# Patient Record
Sex: MALE | Race: WHITE
[De-identification: names, ages, dates, MRNs, and addresses within clinical notes are randomized per-mention and may not be internally consistent; named-entity substitution may affect disease eponyms.]

---

## 2017-03-05 ENCOUNTER — HOSPITAL ENCOUNTER (OUTPATIENT)
Dept: HOSPITAL 75 - ER | Age: 32
LOS: 1 days | Discharge: HOME | End: 2017-03-06
Attending: SURGERY
Payer: COMMERCIAL

## 2017-03-05 VITALS — WEIGHT: 274.13 LBS | HEIGHT: 69 IN | BODY MASS INDEX: 40.6 KG/M2

## 2017-03-05 DIAGNOSIS — F17.210: ICD-10-CM

## 2017-03-05 DIAGNOSIS — K80.12: Primary | ICD-10-CM

## 2017-03-05 LAB
BASOPHILS # BLD AUTO: 0 10^3/UL (ref 0–0.1)
BASOPHILS NFR BLD AUTO: 0 % (ref 0–10)
EOSINOPHIL # BLD AUTO: 0 10^3/UL (ref 0–0.3)
EOSINOPHIL NFR BLD AUTO: 0 % (ref 0–10)
ERYTHROCYTE [DISTWIDTH] IN BLOOD BY AUTOMATED COUNT: 12.2 % (ref 10–14.5)
LYMPHOCYTES # BLD AUTO: 2.1 X 10^3 (ref 1–4)
LYMPHOCYTES NFR BLD AUTO: 9 % (ref 12–44)
MCH RBC QN AUTO: 29 PG (ref 25–34)
MCHC RBC AUTO-ENTMCNC: 35 G/DL (ref 32–36)
MCV RBC AUTO: 82 FL (ref 80–99)
MONOCYTES # BLD AUTO: 0.8 X 10^3 (ref 0–1)
MONOCYTES NFR BLD AUTO: 4 % (ref 0–12)
NEUTROPHILS # BLD AUTO: 20.1 X 10^3 (ref 1.8–7.8)
NEUTROPHILS NFR BLD AUTO: 87 % (ref 42–75)
PLATELET # BLD: 336 10^3/UL (ref 130–400)
PMV BLD AUTO: 10.7 FL (ref 7.4–10.4)
RBC # BLD AUTO: 5.29 10^6/UL (ref 4.35–5.85)
WBC # BLD AUTO: 23 10^3/UL (ref 4.3–11)

## 2017-03-05 PROCEDURE — 83690 ASSAY OF LIPASE: CPT

## 2017-03-05 PROCEDURE — 80053 COMPREHEN METABOLIC PANEL: CPT

## 2017-03-05 PROCEDURE — 85027 COMPLETE CBC AUTOMATED: CPT

## 2017-03-05 PROCEDURE — 96375 TX/PRO/DX INJ NEW DRUG ADDON: CPT

## 2017-03-05 PROCEDURE — 88304 TISSUE EXAM BY PATHOLOGIST: CPT

## 2017-03-05 PROCEDURE — 85025 COMPLETE CBC W/AUTO DIFF WBC: CPT

## 2017-03-05 PROCEDURE — 85007 BL SMEAR W/DIFF WBC COUNT: CPT

## 2017-03-05 PROCEDURE — 74177 CT ABD & PELVIS W/CONTRAST: CPT

## 2017-03-05 PROCEDURE — 94760 N-INVAS EAR/PLS OXIMETRY 1: CPT

## 2017-03-05 PROCEDURE — 87081 CULTURE SCREEN ONLY: CPT

## 2017-03-05 PROCEDURE — 82150 ASSAY OF AMYLASE: CPT

## 2017-03-05 PROCEDURE — 81000 URINALYSIS NONAUTO W/SCOPE: CPT

## 2017-03-05 PROCEDURE — 96374 THER/PROPH/DIAG INJ IV PUSH: CPT

## 2017-03-05 PROCEDURE — 94640 AIRWAY INHALATION TREATMENT: CPT

## 2017-03-05 PROCEDURE — 36415 COLL VENOUS BLD VENIPUNCTURE: CPT

## 2017-03-05 PROCEDURE — 94664 DEMO&/EVAL PT USE INHALER: CPT

## 2017-03-05 NOTE — XMS REPORT
Continuity of Care Document

 Created on: 2017



MITCHEL FUENTES

External Reference #: [id number]

: 1985

Sex: Male



Demographics







 Address  [address] [place]  [address]

 

 Home Phone  [phone]

 

 Preferred Language  Unknown

 

 Marital Status  Unknown

 

 Buddhist Affiliation  Unknown

 

 Race  Unknown

 

 Ethnic Group  Unknown





Author







 Author  [facility]

 

 Organization  [facility]

 

 Address  Unknown

 

 Phone  Unavailable



                                                      



Allergies

                      





 Active                    Description                    Code                 
   Type                    Severity                    Reaction                
    Onset                    Reported/Identified                    
Relationship to Patient                    Clinical Status                

 

 Yes                    No Known Drug Allergies                    [id number]  
                  Drug Allergy                    Unknown                    N/
A                                         2013                           
                               



                                                                               
         



Medications

                                                                               
         



Problems

                      





 Date Dx Coded                    Attending                    Type            
        Code                    Diagnosis                    Diagnosed By      
          

 

 2013                    RK BENITEZ MD                    Ot    
                719.41                    JOINT PAIN-SHLDER                    
                 

 

 2013                    RK BENITEZ MD                    Ot    
                959.2                    SHLDR/UPPER ARM INJ NOS               
                      

 

 2013                    RK BENITEZ MD                    Ot    
                E000.8                    OTHER EXTERNAL CAUSE STATUS          
                           

 

 2013                    RK BENITEZ MD                    Ot    
                E849.4                    ACCID IN RECREATION AREA             
                        

 

 2013                    RK BENITEZ MD                    Ot    
                E928.9                    ACCIDENT NOS                         
            

 

 2013                    SINA DICK DO                    Ot     
               522.5                    PERIAPICAL ABSCESS                     
                

 

 2013                    SINA DICK DO                    Ot     
               525.9                    DENTAL DISORDER NOS                    
                 

 

 2013                    SARAH LEDEZMA, ALEXANDREA SCHULTZ                    Ot  
                  490                    BRONCHITIS NOS                        
             

 

 2013                    SARAH LEDEZMA, ALEXANDREA SCHULTZ                    Ot  
                  786.2                    COUGH                               
      



                                                                               
                                                                               
          



Procedures

                                                                               
         



Results

                                                                    



Encounters

                      





 ACCT No.                    Visit Date/Time                    Discharge      
              Status                    Pt. Type                    Provider   
                 Facility                    Loc./Unit                    
Complaint                

 

 [id number]                    2013 09:55:00                    2013 11:40:00                    DIS                    Emergency              
      ALEXANDREA SMITH MD                    [facility]                    ER                    CONGESTION COUGHING         
       

 

 [id number]                    2013 16:32:00                    2013 18:13:00                    DIS                    Emergency              
      SINA DICK DO                    [facility]
                    ER                    R CHEEK PAIN                

 

 [id number]                    2013 19:57:00                    2013 22:43:00                    DIS                    Emergency              
      RK BENITEZ MD                    [facility]                    ER                    R SHOULDER POPING OUT OF 
PLACE                

 

 [id number]                    2017 21:23:00                           
              ACT                    Emergency                    ELI LEDEZMA, 
RK HERNÁNDEZ                    [facility]                    AB PAIN

## 2017-03-05 NOTE — ED ABDOMINAL PAIN
General


Chief Complaint:  Abdominal/GI Problems


Stated Complaint:  AB PAIN


Nursing Triage Note:  


onset of right sided abdominal pain beginning at 1700 tonight.


Sepsis Screen:  No Definite Risk


Source of Information:  Patient


Exam Limitations:  No Limitations





History of Present Illness


Time Seen By Provider:  23:07


Initial Comments


31-year-old male patient presents to the emergency department with complaints 

of right-sided abdominal pain beginning at 1700 tonight.  Patient reports 

previous history of symptoms over the last month.  Worse with eating especially 

fatty foods.  Does report nausea and vomiting when pain begins.  Patient 

reports eating a meatball sub approx. 30 min prior to symptoms.


Timing/Duration:  Constant (constant pain since 1700 tonight.), Other (1700 

tonight.)


Severity/Quality:  Aching, Cramping, Sharp


Location:  Other (right-sided abdominal pain)


Radiation:  No Radiation


Activities at Onset:  Other (30 minutes after eating a meatball sub)


Modifying Factors:  Worsens With Eating, Worsens With Movement, Worsens With 

Palpation





Allergies and Home Medications


Allergies


Coded Allergies:  


     No Known Drug Allergies (Unverified , 5/30/13)





Home Medications


Albuterol 8.5 Gm Hfa.aer.ad #1 2 PUFF IH Q4H PRN PRN WHEEZING 


   2 PUFFS 


   Prescribed by: ALEXANDREA MELVIN on 11/17/13 1137


Azithromycin 250 Mg Tablet 5Days 1 TAB PO DAILY 


   Take 500 mg day #1, then 250 mg daily on days #2-5 


   Prescribed by: ALEXANDREA MELVIN on 11/17/13 1137





Review of Systems


Constitutional:  No chills, No fever, No malaise


Respiratory:   No Symptoms Reported


Cardiovascular:   No Symptoms Reported


Gastrointestinal:   See HPIDenies Abdomen Distended,  Abdominal PainDenies 

Blood Streaked Stools, Denies Constipated, Denies Diarrhea,  NauseaDenies 

Rectal Bleeding,  Vomiting


Genitourinary:  Denies Burning, Denies Discharge, Denies Frequency, Denies 

Flank Pain, Denies Hematuria, Denies Pain


Musculoskeletal:  No back pain


Skin:   no symptoms reported


Psychiatric/Neurological:   No Symptoms Reported





All Other Systems Reviewed


Negative Unless Noted:  Yes (Negative excepted noted.)





Past Medical-Social-Family Hx


Patient Social History


Recent Foreign Travel:  No


Contact w/Someone Who Travel:  No


Recent Infectious Disease Expo:  No





Surgeries


HX Surgeries:  No





Respiratory


Hx Respiratory Disorders:  Yes


Respiratory Disorders:  Pneumonia





Cardiovascular


Hx Cardiac Disorders:  No





Neurological


Hx Neurological Disorders:  No





Genitourinary


Hx Genitourinary Disorders:  No





Gastrointestinal


Hx Gastrointestinal Disorders:  No





Musculoskeletal


Hx Musculoskeletal Disorders:  No





Endocrine


Hx Endocrine Disorders:  No





HEENT


HX ENT Disorders:  No





Cancer


Hx Cancer:  No





Psychosocial


Hx Psychiatric Problems:  No





Integumentary


HX Skin/Integumentary Disorder:  No





Blood Transfusions


Hx Blood Disorders:  No





Reviewed Nursing Assessment


Reviewed/Agree w Nursing PMH:  Yes





Family Medical History


Significant Family History:  COPD





Physical Exam


Vital Signs





 VS - Last 72 Hours, by Label








 3/5/17





 22:22


 


Temp 97.2


 


Pulse 69


 


Resp 24


 


B/P 124/70





Capillary Refill : Less Than 3 Seconds


General Appearance:   WD/WN no apparent distress


HEENT:   PERRL/EOMI pharynx normal


Respiratory:   lungs clear normal breath sounds no respiratory distress


Cardiovascular:   regular rate, rhythm no murmur


Gastrointestinal:   normal bowel sounds soft no organomegalyNo distended,  

guarding (right upper quadrant)No rebound,  tenderness (right mid abdomen and 

right upper quadrant.  Positive Eli sign.)


Extremities:   normal capillary refill


Back:   normal inspection no CVA tenderness


Neurologic/Psychiatric:   alert oriented x 3 other (anxious)


Skin:   normal color warm/dry





Progress/Results/Core Measures


Results/Orders


Lab Results





 Laboratory Tests








Test


  3/5/17


23:30 Range/Units


 


 


Alanine Aminotransferase


(ALT/SGPT) 30 


  0-55  U/L


 


 


Albumin 4.2  3.2-4.5  G/DL


 


Alkaline Phosphatase 82    U/L


 


Anion Gap 11  5-14  MMOL/L


 


Aspartate Amino Transf


(AST/SGOT) 24 


  5-34  U/L


 


 


BUN/Creatinine Ratio 9   


 


Band Neutrophils 4   %


 


Basophils # (Auto)


  0.0 


  0.0-0.1


10^3/uL


 


Basophils % (Manual) 0   %


 


Basophils (%) (Auto) 0  0-10  %


 


Blood Morphology Comment NORMAL   


 


Blood Urea Nitrogen 9  7-18  MG/DL


 


Calcium Level 9.0  8.5-10.1  MG/DL


 


Carbon Dioxide Level 24  21-32  MMOL/L


 


Chloride Level 103    MMOL/L


 


Creatinine


  1.01 


  0.60-1.30


MG/DL


 


Eosinophils # (Auto)


  0.0 


  0.0-0.3


10^3/uL


 


Eosinophils % (Manual) 0   %


 


Eosinophils (%) (Auto) 0  0-10  %


 


Estimat Glomerular Filtration


Rate > 60 


   


 


 


Glucose Level 122 H   MG/DL


 


Hematocrit 44  40-54  %


 


Hemoglobin 15.1  13.3-17.7  G/DL


 


Lipase 24  8-78  U/L


 


Lymphocytes # (Auto) 2.1  1.0-4.0  X 10^3


 


Lymphocytes % (Manual) 11   %


 


Lymphocytes (%) (Auto) 9 L 12-44  %


 


Mean Corpuscular Hemoglobin 29  25-34  PG


 


Mean Corpuscular Hemoglobin


Concent 35 


  32-36  G/DL


 


 


Mean Corpuscular Volume 82  80-99  FL


 


Mean Platelet Volume 10.7 H 7.4-10.4  FL


 


Monocytes # (Auto) 0.8  0.0-1.0  X 10^3


 


Monocytes % (Manual) 2   %


 


Monocytes (%) (Auto) 4  0-12  %


 


Neutrophils # (Auto) 20.1 H 1.8-7.8  X 10^3


 


Neutrophils % (Manual) 77   %


 


Neutrophils (%) (Auto) 87 H 42-75  %


 


Platelet Count


  336 


  130-400


10^3/uL


 


Potassium Level 3.6  3.6-5.0  MMOL/L


 


Reactive Lymphocytes 6   %


 


Red Blood Count


  5.29 


  4.35-5.85


10^6/uL


 


Red Cell Distribution Width 12.2  10.0-14.5  %


 


Sodium Level 138  135-145  MMOL/L


 


Total Bilirubin 0.4  0.1-1.0  MG/DL


 


Total Protein 7.3  6.4-8.2  G/DL


 


White Blood Count


  23.0 H


  4.3-11.0


10^3/uL








My Orders





 Orders-ROVERTO CLARKE


Saline Lock/Iv-Start (3/5/17 23:12)


Cbc With Automated Diff (3/5/17 23:12)


Comprehensive Metabolic Panel (3/5/17 23:12)


Lipase (3/5/17 23:12)


Ua Culture If Indicated (3/5/17 23:12)


Fentanyl  Injection (Sublimaze Injection (3/5/17 23:12)


Ondansetron Injection (Zofran Injectio (3/5/17 23:15)


Ns Iv 1000 Ml (Sodium Chloride 0.9%) (3/5/17 23:12)


Ct Abdomen/Pelvis W (3/5/17 23:12)


Manual Differential (3/5/17 23:30)


Iohexol Injection (Omnipaque 350 Mg/Ml 1 (3/6/17 00:00)


Ns (Ivpb) (Sodium Chloride 0.9% Ivpb Bag (3/6/17 00:00)


Morphine  Injection (Morphine  Injection (3/6/17 00:05)


Hydromorphone Injection (Dilaudid Inject (3/6/17 01:04)


Hydromorphone Injection (Dilaudid Inject (3/6/17 01:05)





Medications Given in ED





 Current Medications








 Medications  Dose


 Ordered  Sig/Shayna


 Route  Start Time


 Stop Time Status Last Admin


Dose Admin


 


 Iohexol  100 ml  ONCE  ONCE


 IV  3/6/17 00:00


 3/6/17 00:01 DC 3/5/17 23:56


100 ML


 


 Ondansetron HCl 4


 mg  4 mg  ONCE  ONCE


 IVP  3/5/17 23:15


 3/5/17 23:16 DC 3/5/17 23:33


4 MG


 


 Sodium Chloride  100 ml  ONCE  ONCE


 IV  3/6/17 00:00


 3/6/17 00:01 DC 3/5/17 23:56


80 ML


 


 Sodium Chloride  1,000 ml @ 


 0 mls/hr  Q0M ONCE


 IV  3/5/17 23:12


 3/5/17 23:13 DC 3/5/17 23:37


0 MLS/HR








Vital Signs/I&O





 Vital Sign - Last 12Hours








 3/5/17





 22:22


 


Temp 97.2


 


Pulse 69


 


Resp 24


 


B/P 124/70








Blood Pressure Mean:  88





Diagnostic Imaging





   Diagonstic Imaging:  CT


   Plain Films/CT/US/NM/MRI:  abdomen, pelvis


Comments


Cholelithiasis without CT evidence of acute cholecystitis.  Appendix is 

unremarkable.


   Reviewed:  Other (statrad report reviewed by me)





Departure


Communication


Time/Spoke to Admitting Phy:  00:40


Communication


Dr. Saldaña excepts patient to his surgery service for IV antibiotics, IV fluids

, pain control, and cholecystectomy.


Progress Notes


Laboratory and diagnostic findings discussed with the patient.  Patient was 

given 100 g of fentanyl, 10 mg of morphine with minimal improvement in 

abdominal pain.  Patient continues to rate pain as a 8-9/10.  Plan for 

admission discussed with the patient for pain control, IV fluids, and lap 

cholecystectomy in a.m. by Dr. Saldaña.  Patient voices understanding and 

agrees with the treatment plan.  Patient given 1 mg Dilaudid IV in the 

emergency department.  Patient case discussed with Dr. Maldonado, he agrees with 

the plan of care.





Impression


Impression:  


 Primary Impression:  


 Intractable abdominal pain


 Additional Impressions:  


 Leukocytosis


 Qualified Code:  D72.829 - Elevated white blood cell count, unspecified


 Cholelithiasis


 Qualified Code:  K80.20 - Calculus of gallbladder without cholecystitis 

without obstruction


Disposition:  09 ADMITTED AS INPATIENT


Condition:  Stable


Decision to Admit Reason:  Admit from ER (General)


Decision to Admit/Date:  Mar 6, 2017


Time/Decision to Admit Time:  00:40





Departure-Patient Inst.


Referrals:  


NO,LOCAL PHYSICIAN (PCP/Family)


Primary Care Physician








ROVERTO CLARKE Mar 5, 2017 23:07

## 2017-03-06 VITALS — SYSTOLIC BLOOD PRESSURE: 115 MMHG | DIASTOLIC BLOOD PRESSURE: 74 MMHG

## 2017-03-06 VITALS — DIASTOLIC BLOOD PRESSURE: 65 MMHG | SYSTOLIC BLOOD PRESSURE: 105 MMHG

## 2017-03-06 VITALS — DIASTOLIC BLOOD PRESSURE: 81 MMHG | SYSTOLIC BLOOD PRESSURE: 97 MMHG

## 2017-03-06 VITALS — DIASTOLIC BLOOD PRESSURE: 68 MMHG | SYSTOLIC BLOOD PRESSURE: 132 MMHG

## 2017-03-06 LAB
ALBUMIN SERPL-MCNC: 3.7 G/DL (ref 3.2–4.5)
ALBUMIN SERPL-MCNC: 4.2 G/DL (ref 3.2–4.5)
ALT SERPL-CCNC: 26 U/L (ref 0–55)
ALT SERPL-CCNC: 30 U/L (ref 0–55)
AMYLASE SERPL-CCNC: 22 U/L (ref 25–125)
ANION GAP SERPL CALC-SCNC: 10 MMOL/L (ref 5–14)
ANION GAP SERPL CALC-SCNC: 11 MMOL/L (ref 5–14)
AST SERPL-CCNC: 21 U/L (ref 5–34)
AST SERPL-CCNC: 24 U/L (ref 5–34)
BASOPHILS # BLD AUTO: 0 10^3/UL (ref 0–0.1)
BASOPHILS NFR BLD AUTO: 0 % (ref 0–10)
BASOPHILS NFR BLD MANUAL: 0 %
BILIRUB SERPL-MCNC: 0.4 MG/DL (ref 0.1–1)
BILIRUB SERPL-MCNC: 0.5 MG/DL (ref 0.1–1)
BUN SERPL-MCNC: 7 MG/DL (ref 7–18)
BUN SERPL-MCNC: 9 MG/DL (ref 7–18)
BUN/CREAT SERPL: 8
BUN/CREAT SERPL: 9
CALCIUM SERPL-MCNC: 8.3 MG/DL (ref 8.5–10.1)
CALCIUM SERPL-MCNC: 9 MG/DL (ref 8.5–10.1)
CHLORIDE SERPL-SCNC: 103 MMOL/L (ref 98–107)
CHLORIDE SERPL-SCNC: 106 MMOL/L (ref 98–107)
CO2 SERPL-SCNC: 23 MMOL/L (ref 21–32)
CO2 SERPL-SCNC: 24 MMOL/L (ref 21–32)
CREAT SERPL-MCNC: 0.83 MG/DL (ref 0.6–1.3)
CREAT SERPL-MCNC: 1.01 MG/DL (ref 0.6–1.3)
EOSINOPHIL # BLD AUTO: 0 10^3/UL (ref 0–0.3)
EOSINOPHIL NFR BLD AUTO: 0 % (ref 0–10)
EOSINOPHIL NFR BLD MANUAL: 0 %
ERYTHROCYTE [DISTWIDTH] IN BLOOD BY AUTOMATED COUNT: 12.3 % (ref 10–14.5)
GFR SERPLBLD BASED ON 1.73 SQ M-ARVRAT: > 60 ML/MIN
GFR SERPLBLD BASED ON 1.73 SQ M-ARVRAT: > 60 ML/MIN
GLUCOSE SERPL-MCNC: 112 MG/DL (ref 70–105)
GLUCOSE SERPL-MCNC: 122 MG/DL (ref 70–105)
LIPASE SERPL-CCNC: 12 U/L (ref 8–78)
LIPASE SERPL-CCNC: 24 U/L (ref 8–78)
LYMPHOCYTES # BLD AUTO: 2.4 X 10^3 (ref 1–4)
LYMPHOCYTES NFR BLD AUTO: 15 % (ref 12–44)
MCH RBC QN AUTO: 29 PG (ref 25–34)
MCHC RBC AUTO-ENTMCNC: 35 G/DL (ref 32–36)
MCV RBC AUTO: 83 FL (ref 80–99)
MONOCYTES # BLD AUTO: 0.8 X 10^3 (ref 0–1)
MONOCYTES NFR BLD AUTO: 5 % (ref 0–12)
NEUTROPHILS # BLD AUTO: 12.4 X 10^3 (ref 1.8–7.8)
NEUTROPHILS NFR BLD AUTO: 80 % (ref 42–75)
NEUTS BAND NFR BLD MANUAL: 77 %
NEUTS BAND NFR BLD: 4 %
PLATELET # BLD: 275 10^3/UL (ref 130–400)
PMV BLD AUTO: 10.8 FL (ref 7.4–10.4)
POTASSIUM SERPL-SCNC: 3.6 MMOL/L (ref 3.6–5)
POTASSIUM SERPL-SCNC: 4 MMOL/L (ref 3.6–5)
PROT SERPL-MCNC: 6.4 G/DL (ref 6.4–8.2)
PROT SERPL-MCNC: 7.3 G/DL (ref 6.4–8.2)
RBC # BLD AUTO: 4.99 10^6/UL (ref 4.35–5.85)
SODIUM SERPL-SCNC: 138 MMOL/L (ref 135–145)
SODIUM SERPL-SCNC: 139 MMOL/L (ref 135–145)
VARIANT LYMPHS NFR BLD MANUAL: 11 %
VARIANT LYMPHS NFR BLD MANUAL: 6 %
WBC # BLD AUTO: 15.6 10^3/UL (ref 4.3–11)

## 2017-03-06 RX ADMIN — MORPHINE SULFATE PRN MG: 4 INJECTION, SOLUTION INTRAMUSCULAR; INTRAVENOUS at 02:51

## 2017-03-06 RX ADMIN — SODIUM CHLORIDE, SODIUM LACTATE, POTASSIUM CHLORIDE, AND CALCIUM CHLORIDE PRN MLS/HR: 600; 310; 30; 20 INJECTION, SOLUTION INTRAVENOUS at 13:35

## 2017-03-06 RX ADMIN — CIPROFLOXACIN SCH MLS/HR: 2 INJECTION, SOLUTION INTRAVENOUS at 15:59

## 2017-03-06 RX ADMIN — Medication SCH ML: at 15:51

## 2017-03-06 RX ADMIN — SODIUM CHLORIDE SCH MLS/HR: 900 INJECTION, SOLUTION INTRAVENOUS at 11:29

## 2017-03-06 RX ADMIN — METRONIDAZOLE SCH MLS/HR: 5 INJECTION, SOLUTION INTRAVENOUS at 13:28

## 2017-03-06 RX ADMIN — CIPROFLOXACIN SCH MLS/HR: 2 INJECTION, SOLUTION INTRAVENOUS at 04:48

## 2017-03-06 RX ADMIN — SODIUM CHLORIDE, SODIUM LACTATE, POTASSIUM CHLORIDE, AND CALCIUM CHLORIDE PRN MLS/HR: 600; 310; 30; 20 INJECTION, SOLUTION INTRAVENOUS at 12:10

## 2017-03-06 RX ADMIN — MORPHINE SULFATE PRN MG: 4 INJECTION, SOLUTION INTRAMUSCULAR; INTRAVENOUS at 05:34

## 2017-03-06 RX ADMIN — Medication SCH ML: at 06:00

## 2017-03-06 RX ADMIN — METRONIDAZOLE SCH MLS/HR: 5 INJECTION, SOLUTION INTRAVENOUS at 06:00

## 2017-03-06 RX ADMIN — MORPHINE SULFATE PRN MG: 4 INJECTION, SOLUTION INTRAMUSCULAR; INTRAVENOUS at 08:22

## 2017-03-06 RX ADMIN — SODIUM CHLORIDE SCH MLS/HR: 900 INJECTION, SOLUTION INTRAVENOUS at 04:48

## 2017-03-06 NOTE — DISCHARGE INST-SIMPLE/STANDARD
Discharge Inst-Standard


Discharge Medications


New, Converted or Re-Newed RX:  RX on Chart





Patient Instructions/Follow Up


Plan of Care/Instructions/FU:  


incentive spirometry.  Dressings off in 48 hours.  Follow-up in 2 weeks


Activity as Tolerated:  Yes


Discharge Diet:  No Restrictions








ETTA ANTOINE MD Mar 6, 2017 2:18 pm

## 2017-03-06 NOTE — DIAGNOSTIC IMAGING REPORT
PROCEDURE: CT abdomen and pelvis with contrast.



TECHNIQUE: Multiple contiguous axial images were obtained

through the abdomen and pelvis after administration of

intravenous contrast.



INDICATION: Right upper quadrant pain, postprandial worsening in

severity recently.



Multiple stones within the gallbladder lumen present. The

gallbladder is nondistended and showed no obvious wall

thickening or pericholecystic edema. The liver, bile ducts,

spleen, adrenals, pancreas all normal. There is no

hydronephrosis. No bowel obstruction. There is no appendicitis

or diverticulitis. No focal inflammatory process. No ascites,

abscess, hematoma or other fluid collection.



IMPRESSION: Cholelithiasis, otherwise normal.



Agree with preliminary.



Dictated by:



Dictated on workstation # [id number]

## 2017-03-06 NOTE — PROGRESS NOTE-PRE OPERATIVE
Pre-Operative Progress Note


H&P Reviewed


The H&P was reviewed, patient examined and no changes noted.


Date H&P Reviewed:  Mar 6, 2017


Time H&P Reviewed:  08:23


Pre-Operative Diagnosis:  gallstones with acute cholecystitis








ETTA ANTOINE MD Mar 6, 2017 8:23 am

## 2017-03-06 NOTE — PROGRESS NOTE-POST OPERATIVE
Post-Operative Progess Note


Pre-Operative Diagnosis


gallstones with acute cholecystitis





Post-Operative Diagnosis





same





Post-Op Procedure Note


Date of Procedure:  Mar 6, 2017


Name of Procedure:  


rrobotic-assisted cholecystectomy


Anesthesia Type


Gen.


Estimated blood loss (mL):  minimal


Specimen(s) collected


gallbladder








ETTA ANTOINE MD Mar 6, 2017 2:16 pm

## 2017-03-06 NOTE — HISTORY & PHYSICIAL
History of Present Illness


History of Present Illness


Reason for visit/HPI


3 months history of right upper quadrant pain radiating around the costal 

margin to the back, with acute exacerbation over the last 12 hours.  CT has 

confirmed gallstones with early acute cholecystitis.


Date of Admission


Mar 6, 2017 at 1:13 am


I consulted on this patient on


3/6/17


 08:20


Attending Physician


Etta Antoine MD


Admitting Physician


No,Local Physician


Consult








Allergies and Home Medications


Allergies


Coded Allergies:  


     No Known Drug Allergies (Unverified , 5/30/13)





Home Medications


Albuterol 8.5 Gm Hfa.aer.ad #1 2 PUFF IH Q4H PRN PRN WHEEZING 


   2 PUFFS 


   Prescribed by: ALEXANDREA MELVIN on 11/17/13 1137


Azithromycin 250 Mg Tablet 5Days 1 TAB PO DAILY 


   Take 500 mg day #1, then 250 mg daily on days #2-5 


   Prescribed by: ALEXANDREA MELVIN on 11/17/13 1137





Past Medical-Social-Family Hx


Patient Social History


Marrital Status:  


Employed/Student:  employed


Alcohol Use:  Denies Use


Recreational Drug Use:  No


Smoking Status:  Current Everyday Smoker


Type Used:  Cigarettes


2nd Hand Smoke Exposure:  No


Physical Abuse Screen:  No


Sexual Abuse:  No


Recent Foreign Travel:  No


Contact w/other who traveled:  No


Recent Hopitalizations:  No


Recent Infectious Disease Expo:  No





Immunizations Up To Date


Tetanus Booster (TDap):  Unknown





Seasonal Allergies


Seasonal Allergies:  No





Surgeries


HX Surgeries:  No





Respiratory


Hx Respiratory Disorders:  Yes





Cardiovascular


Hx Cardiovascular Disorders:  No





Neurological


Hx Neurological Disorders:  No





Genitourinary


Hx Genitourinary Disorders:  No





Gastrointestinal


Hx Gastrointestinal Disorders:  No





Musculoskeletal


Hx Musculoskeletal Disorders:  No





Endocrine


Hx Endocrine Disorders:  No





HEENT


HX ENT Disorders:  No





Cancer


Hx Cancer:  No





Psychosocial


Hx Psychiatric Problems:  No





Integumentary


HX Skin/Integumentary Disorder:  No





Blood Transfusions


Hx Blood Disorders:  No


Adverse Reaction to a Blood Tr:  No





Reviewed Nursing Assessment


Reviewed/Agree w Nursing PMH:  Yes





Family Medical History


Significant Family History:  COPD


Family Hx:  


FH: congestive heart failure


  19 FATHER





Constitutional:   fever


EENTM:   no symptoms reported


Respiratory:   cough


Cardiovascular:   no symptoms reported


Gastrointestinal:   RUQ abdominal pain (RUQ)


Genitourinary:   no symptoms reported


Musculoskeletal:   no symptoms reported


Skin:   no symptoms reported


Psychiatric/Neurological:   No Symptoms Reported





Physical Exam


Vital Signs





 Vital Sign - Last 12Hours








 3/5/17 3/6/17 3/6/17





 22:22 02:05 02:12


 


Temp 97.2  


 


Pulse 69  


 


Resp 24  


 


B/P 124/70  


 


Pulse Ox  98 


 


O2 Delivery   Room Air





Capillary Refill : Less Than 3 Seconds


General Appearance:   Mild Distress


HEENT:   PERRL/EOMI


Neck:   Normal Inspection


Respiratory:   Lungs Clear


Cardiovascular:   Regular Rate, Rhythm


Gastrointestinal:   Soft Tenderness


Rectal:   Deferred


Back:   Normal Inspection


Extremity:   Normal Capillary Refill Normal Inspection


Neurologic/Psychiatric:   Alert Oriented x3


Skin:   Warm/Dry


Comments


mild tenderness over the right upper quadrant.  Superficial folliculitis of the 

abdominal wall.  No hernia





Assessment/Plan


Assessment and Plan


gentleman with gallstones and early acute cholecystitis.  LFTs normal.  Offered 

cholecystectomy using minimally invasive technique and possible cholangiogram.  

Expected recovery, complications of postoperative bile leak wound infection 

etc. discussed.  Incentive spirometry will be used preoperatively and continued 

after surgery.  Encouraged to stop smoking





Admission Diagnosis


gallstones with acute cholecystitis





Clinical Quality Measures


DVT/VTE Risk/Contraindication:


Risk Factor Score Per Nursing:  3


RFS Level Per Nursing on Admit:  3=High








ETTA ANTOINE MD Mar 6, 2017 8:23 am

## 2017-03-07 NOTE — OPERATIVE REPORT
PROCEDURE PHYSICIAN:   ETTA ANTOINE

 

DATE OF PROCEDURE:  

03/06/2017

 

PREOPERATIVE DIAGNOSIS:  

1.   Gallstones.

2.   Cholecystitis.  

 

POSTOPERATIVE DIAGNOSIS:

1.   Gallstones. 

2.   Cholecystitis. 

 

OPERATION:

Robotic assisted cholecystectomy.  

 

SURGEON: 

Piotr 

 

ANESTHESIA:

General anesthesia.

 

BLOOD LOSS:

Minimal. 

 

FLUIDS:

1500 mL crystalloids. 

 

TYPE OF WOUND:  

Type III (contaminate wound). 

 

INDICATION FOR THE PROCEDURE:

This gentleman presented with gallstones and early acute

cholecystitis. He was offered cholecystectomy using minimally

invasive technique with robotic assistance, after a brief period

of intravenous antibiotics. Informed consent was obtained after

reviewing the operative details and complications of wound

infection, bile leak and the requirement for postoperative ERCP,

should stones be found in the common bile duct. 

 

DESCRIPTION OF PROCEDURE:

He was placed supine on the operative table and general

anesthesia induced using an endotracheal tube. 2 grams of Ancef

and 500 mg of Flagyl were administered intravenously as

prophylaxis against wound infection. Sequential compression

devices were placed around his legs, to minimize the risk of

venous thrombosis. 

 

Abdomen was prepared and draped in the usual sterile manner. A

supraumbilical incision was made and the linea alba incised

vertically. A Ocampo cannula was placed and carbon dioxide

insufflated, to an intra-abdominal pressure of 15 mmHg. 

Intraabdominal pressure was maintained at 17 mmHg (obesity) using

carbon dioxide insufflation. A 12 mm trocar was placed and

anatomy visualized using the high definition, 3 dimensional

laparoscope associated with da MideoMe system. Under direct view, I

placed an 8 mm cannula over each side of the abdomen, followed by

a 5 mm trocar over the left upper quadrant to facilitate

retraction of the fundus of the gallbladder. The patient was then

turned into steep reverse Trendelenburg position and the robotic

system docked in place. 

 

Laparoscopic survey confirmed an acutely inflamed gallbladder with

severe edema. The fundus was retracted cephalad and infundibulum

grasped with Cadiere forceps.  Inflamed tissue around the neck of

the gallbladder was incised using hook cautery, delineating the

cystic duct and artery. Both were divided between locking clips.

Cholecystectomy was then completed using the same device.

Subhepatic space was irrigated with saline and the gallbladder

placed in an Endo Catch bag, to be removed via the supraumbilical

trocar site. The fascia over this incision had to be extended

laterally to facilitate removal of the large gallbladder.

Subsequently, the fascia was closed using number 1 Vicryl, in an

interrupted fashion. Subcutaneous tissue over the supraumbilical

position was approximated using 3-0 Vicryl. Skin incisions were

closed using 4-0 Vicryl, in a subcuticular fashion. 

 

0.25% Marcaine with epinephrine was infiltrated along the

incisions, both preemptively and at the conclusion of the

operation. 

 

He tolerated the procedure well, was extubated in the operating

room and taken to the recovery room in a stable condition.

Needles, sponges, and instruments were correct at the end of the

operation. 

 

 

 

Job ID: [id number]

Dictated Date: 03/06/2017 14:16:30 

Transcription Date: 03/07/2017 13:18:19 / yudi [email or web address]

## 2017-03-07 NOTE — XMS REPORT
Continuity of Care Document

 Created on: 2017



DARION MORENO

External Reference #: [id number]

: 1985

Sex: Male



Demographics







 Address  [address] [place]  [address]

 

 Home Phone  [phone]

 

 Preferred Language  English

 

 Marital Status  Unknown

 

 Congregational Affiliation  Unknown

 

 Race  Unknown

 

 Ethnic Group  Unknown





Author







 Author  [facility]

 

 Organization  [facility]

 

 Address  Unknown

 

 Phone  Unavailable







Support







 Name  Relationship  Address  Phone

 

 ETTA ANTOINE MD  Caregiver  [address] [place]  [address] [phone]

 

 ROVERTO CLARKE  Caregiver  [address] [place]  [address]  [phone]

 

 NO, LOCAL PHYSICIAN  Caregiver  Unknown  Unavailable

 

 RK BENITEZ MD  Caregiver  [address]  [address] [phone]

 

 BRENDA GIBSON  Next Of Kin  [address]

[place]  [address] [phone]







Care Team Providers







 Care Team Member Name  Role  Phone

 

 NO, LOCAL PHYSICIAN  PCP  Unavailable







Insurance Providers







 Payer Name  Policy Number  Subscriber Name  Relationship

 

 Self Pay     Darion Moreno  18 Self / Same As Patient







Advance Directives







 Directive  Response  Recorded Date/Time

 

 Advance Directives  No  17 2:12am

 

 Health Care Power of   No  17 2:12am

 

 Organ Donor  Yes  17 2:12am

 

 Resuscitation Status  Full Code  17 2:12am







Chief Complaint and Reason for Visit







 Chief Complaint  INTRACTABLE RUQ PAIN, LEUKOCYTOSIS, CHOLELITHIASIS

 

 Reason for Visit  Cholelithiasis

Intractable abdominal pain

Leukocytosis







Problems

Active Problems







 Medical Problem  Onset Date  Status

 

 Bronchitis  Unknown  Acute

 

 Cholelithiasis  Unknown  Acute

 

 Intractable abdominal pain  Unknown  Acute

 

 Leukocytosis  Unknown  Acute







Medications

Current Home Medications







 Medication  Dose  Units  Route  Directions  Days/Qty  Instructions  Start Date

 

 Hydrocodone/Acetaminophen 1 Each  1-2  Tab  Oral  4-6HR as needed for Pain  30
     17





Past Home Medications







 Medication  Directions  Ordered  Status

 

 Acetaminophen/Hydrocodone Bitart (Norco) 1 Each Tablet, 0.5 - 1 Each Oral  Q 4 
- 6 Hrs Prn  13  Discontinued

 

 Penicillin V Potassium 250 Mg Tablet, 500 Mg Oral  Four Times Daily  13  
Discontinued

 

 Albuterol 8.5 Gm Hfa.aer.ad, 2 Puff Inhalation  Every 4HRS as needed for 
Wheezing  13  Discontinued

 

 Azithromycin 250 Mg Tablet, 1 Tab Oral  Daily  13  Discontinued







Social History







 Social History Problem  Response  Recorded Date/Time

 

 Alcohol Use  Denies Use  2013 10:00am

 

 Recreational Drug Use  No  2013 10:00am

 

 Recent Foreign Travel  No  2017 2:12am

 

 Recent Infectious Disease Exposure  No  2017 2:12am

 

 Hospitalization with Isolation  Denies  2017 6:50pm

 

 Smoking Status  Current Everyday Smoker  2017 2:12am

 

 Type Used  Cigarettes  2017 6:50pm

 

 Recent Hopitalizations  No  2017 2:12am

 

 Hospitalization with Isolation  Denies  2017 6:50pm









 Query  Response  Start Date  Stop Date

 

 Smoking Status  Current Everyday Smoker      







Hospital Discharge Instructions

 

Patient Instructions

Physician Instructions

 

New, Converted or Re-Newed RX:  RX on Chart

Plan of Care/Instructions/FU:  

incentive spirometry.  Dressings off in 48 hours.  Follow-up in 2 weeks

Activity as Tolerated:  Yes

Discharge Diet:  No Restrictions

 

 

Care Plan

Patient Instructions:: incentive spirometry.  Dressings off in 48 hours.  

Follow-up in 2 weeks

 



Plan of Care







 Discharge Date  17 6:41pm

 

 Disposition  01 HOME, SELF-CARE

 

 Instructions/Education Provided  Cholecystectomy, Laparoscopic Surgery

 

 Forms Provided  PDI Medical

PDI Surgical

 

 Prescriptions  See Medication Section

 

 Referrals  ETTA ANTOINE MD (Unspecified) - 17 

Address:

[address] [place] [address]

 [phone]



Reason(s) for Referral:

 AT 2:30 P.M.

 

 Additional Instructions/Education  SHOWER PAT DRY

 

 Care Plan and Goals  See Discharge Instructions Section







Functional Status







 Query  Response  Date Recorded

 

 Patient Orientation  Person

Place

Time

Situation

Eyes Open

  2017 6:50pm

 

 Comprehension Ability  Understands Concepts

  2017 2:12am







Allergies, Adverse Reactions, Alerts

No known allergies.



Immunizations







 Name  Given  Type

 

 FLU TRIvalent 5 years - Adult  17  Administered







Vital Signs

Acute Vital Signs





 Vital  Response  Date/Time

 

 Temperature (Fahrenheit)  95.5 degrees F (97.6 - 99.5)  2017 4:11pm

 

 Temperature (Calculated Celsius)  35.48198 degrees C (36.4 - 37.5)  2017 
4:11pm

 

 Temperature Source  Tympanic  2017 4:11pm

 

 Pulse Rate (adult)  58 bpm (60 - 90)  2017 4:11pm

 

 Respiratory Rate  20 bpm (12 - 24)  2017 4:11pm

 

 O2 Sat by Pulse Oximetry  98 % (88 - 100)  2017 4:11pm

 

 Blood Pressure  115/74 mm Hg  2017 4:11pm

 

    Blood Pressure Mean  88 mm Hg  2017 4:11pm

 

 Pain      

 

    Numeric Pain Scale  0-No Pain  2017 4:11pm

 

 Height (Feet)  5 feet  2017 2:12am

 

 Height (Inches)  9.00 inches  2017 2:12am

 

 Height (Calculated Centimeters)  175.543233 cm  2017 2:12am

 

 Weight (Pounds)  274 pounds  2017 2:12am

 

 Weight (Ounces)  2.0 oz  2017 2:12am

 

 Weight (Calculated Grams)  855654.01 gm  2017 2:12am

 

 Weight (Calculated Kilograms)  124.711397 kilograms  2017 2:12am

 

 Calculated BMI  40.5  2017 2:12am

 

 Capillary Refill      

 

    Capillary Refill  Less Than 3 Seconds  2017 10:22pm







Results

Laboratory Results







 Test Name  Result  Units  Flags  Reference  Collection Date/Time  Result Date/
Time  Comments

 

 White Blood Count  15.6  10^3/uL  H  4.3-11.0  2017 5:30am  2017 5:
53am   

 

 Red Blood Count  4.99  10^6/uL     4.35-5.85  2017 5:30am  2017 5:
53am   

 

 Hemoglobin  14.5  G/DL     13.3-17.7  2017 5:30am  2017 5:53am   

 

 Hematocrit  41  %     40-54  2017 5:30am  2017 5:53am   

 

 Mean Corpuscular Volume  83  FL     80-99  2017 5:30am  2017 5:
53am   

 

 Mean Corpuscular Hemoglobin  29  PG     25-34  2017 5:30am  2017 5:
53am   

 

 Mean Corpuscular Hemoglobin Concent  35  G/DL     32-36  2017 5:302017 5:53am   

 

 Red Cell Distribution Width  12.3  %     10.0-14.5  2017 5:302017 5:53am   

 

 Platelet Count  275  10^3/uL     130-400  2017 5:302017 5:53am
   

 

 Mean Platelet Volume  10.8  FL  H  7.4-10.4  2017 5:30am  2017 5:
53am   

 

 Neutrophils (%) (Auto)  80  %  H  42-75  2017 5:30am  2017 5:53am 
  

 

 Lymphocytes (%) (Auto)  15  %     12-44  2017 5:302017 5:53am 
  

 

 Monocytes (%) (Auto)  5  %     0-12  2017 5:30am  2017 5:53am   

 

 Eosinophils (%) (Auto)  0  %     0-10  2017 5:30am  2017 5:53am   

 

 Basophils (%) (Auto)  0  %     0-10  2017 5:30am  2017 5:53am   

 

 Neutrophils # (Auto)  12.4  X 10^3  H  1.8-7.8  2017 5:30am  2017 5
:53am   

 

 Lymphocytes # (Auto)  2.4  X 10^3     1.0-4.0  2017 5:302017 5:
53am   

 

 Monocytes # (Auto)  0.8  X 10^3     0.0-1.0  2017 5:302017 5:
53am   

 

 Eosinophils # (Auto)  0.0  10^3/uL     0.0-0.3  2017 5:30am  2017 5
:53am   

 

 Basophils # (Auto)  0.0  10^3/uL     0.0-0.1  2017 5:30am  2017 5:
53am   

 

 Neutrophils % (Manual)  77  %        2017 11:30pm  2017 12:07am   

 

 Band Neutrophils  4  %        2017 11:30pm  2017 12:07am   

 

 Lymphocytes % (Manual)  11  %        2017 11:30pm  2017 12:07am   

 

 Monocytes % (Manual)  2  %        2017 11:30pm  2017 12:07am   

 

 Eosinophils % (Manual)  0  %        2017 11:30pm  2017 12:07am   

 

 Basophils % (Manual)  0  %        2017 11:30pm  2017 12:07am   

 

 Reactive Lymphocytes  6  %        2017 11:30pm  2017 12:07am   

 

 Blood Morphology Comment  NORMAL           2017 11:30pm  2017 12:
07am   

 

 Urine Color  YELLOW           2017 8:45am   

 

 Urine Clarity  CLEAR           2017 8:45am   

 

 Urine pH  5        5-9  2017 8:45am   

 

 Urine Specific Gravity  1.015     *  1.016-1.022  2017 8:
45am   

 

 Urine Protein  1+     *  NEGATIVE  2017 8:45am   

 

 Urine Glucose (UA)  NEGATIVE        NEGATIVE  2017 8:45am   

 

 Urine RBC (Auto)  NEGATIVE        NEGATIVE  2017 8:45am   

 

 Urine Ketones  NEGATIVE        NEGATIVE  2017 8:45am   

 

 Urine Nitrite  NEGATIVE        NEGATIVE  2017 8:45am   

 

 Urine Bilirubin  NEGATIVE        NEGATIVE  2017 8:45am   

 

 Urine Urobilinogen  NORMAL  MG/DL     NORMAL  2017 8:45am   

 

 Urine Leukocyte Esterase  NEGATIVE        NEGATIVE  2017 8:
45am   

 

 Urine RBC  NONE  /HPF        2017 8:45am   

 

 Urine WBC  NONE  /HPF        2017 8:45am   

 

 Urine Bacteria  NEGATIVE  /HPF        2017 8:45am   

 

 Urine Squamous Epithelial Cells  5-10  /HPF        2017 8:
45am   

 

 Urine Crystals  NONE  /LPF        2017 8:45am   

 

 Urine Casts  NONE  /LPF        2017 8:45am   

 

 Urine Mucus  NEGATIVE  /LPF        2017 8:45am   

 

 Urine Culture Indicated  NO           2017 8:45am   

 

 Sodium Level  139  MMOL/L     135-145  2017 5:30am  2017 6:36am   

 

 Potassium Level  4.0  MMOL/L     3.6-5.0  2017 5:30am  2017 6:36am
   

 

 Chloride Level  106  MMOL/L       2017 5:30am  2017 6:36am   

 

 Carbon Dioxide Level  23  MMOL/L     21-32  2017 5:30am  2017 6:
36am   

 

 Anion Gap  10  MMOL/L     5-14  2017 5:30am  2017 6:36am   

 

 Blood Urea Nitrogen  7  MG/DL     7-18  2017 5:30am  2017 6:36am   

 

 Creatinine  0.83  MG/DL     0.60-1.30  2017 5:30am  2017 6:36am   

 

 BUN/Creatinine Ratio  8           2017 5:30am  2017 6:36am   

 

 Estimat Glomerular Filtration Rate  > 60           2017 5:30am  2017 6:36am                    GFR INTERPRETIVE DATA  

  

         UNITS FOR ESTIMATED GFR (eGFR): mL/min/1.73 M2  

         REFERENCE RANGE FOR ESTIMATED GFR (eGFR)  

                                          eGFR  

         NORMAL eGFR                       >60  

         MODERATELY DECREASED eGFR          30-59  

         SEVERLY DECREASED eGFR             15-29  

         KIDNEY FAILURE                    <15 (OR DIALYSIS)  

 

 Glucose Level  112  MG/DL  H    2017 5:30am  2017 6:36am   

 

 Calcium Level  8.3  MG/DL  L  8.5-10.1  2017 5:30am  2017 6:36am   

 

 Total Bilirubin  0.5  MG/DL     0.1-1.0  2017 5:30am  2017 6:36am 
  

 

 Alkaline Phosphatase  72  U/L       2017 5:30am  2017 6:36am
   

 

 Aspartate Amino Transf (AST/SGOT)  21  U/L     5-34  2017 5:30am  2017 6:36am   

 

 Alanine Aminotransferase (ALT/SGPT)  26  U/L     0-55  2017 5:30am   6:36am   

 

 Total Protein  6.4  G/DL     6.4-8.2  2017 5:30am  2017 6:36am   

 

 Albumin  3.7  G/DL     3.2-4.5  2017 5:30am  2017 6:36am   

 

 Amylase Level  22  U/L  L    2017 5:30am  2017 7:10am   

 

 Lipase  12  U/L     8-78  2017 5:30am  2017 7:10am   







Procedures







 Procedure  Status  Date  Provider(s)

 

 Robot-assisted laparoscopic cholecystectomy  Completed  17  ETTA ANTOINE MD







Encounters







 Encounter  Location  Arrival/Admit Date  Discharge/Depart Date  Attending 
Provider

 

 Discharged Inpatient (obs)  [facility]  17 1:13am   6:41pm  ETTA ANTOINE MD











 Recent Diagnosis

 

 Cholelithiasis

 

 Intractable abdominal pain

 

 Leukocytosis

## 2017-03-07 NOTE — XMS REPORT
Continuity of Care Document

 Created on: 2017



DARION MORENO

External Reference #: [id number]

: 1985

Sex: Male



Demographics







 Address  [address] [place]  [address]

 

 Home Phone  [phone]

 

 Preferred Language  English

 

 Marital Status  Unknown

 

 Mandaeism Affiliation  Unknown

 

 Race  Unknown

 

 Ethnic Group  Unknown





Author







 Author  [facility]

 

 Organization  [facility]

 

 Address  Unknown

 

 Phone  Unavailable







Support







 Name  Relationship  Address  Phone

 

 ETTA ANTOINE MD  Caregiver  [address] [place]  [address] [phone]

 

 ROVERTO CLARKE  Caregiver  [address] [place]  [address]  [phone]

 

 NO, LOCAL PHYSICIAN  Caregiver  Unknown  Unavailable

 

 RK BENITEZ MD  Caregiver  [address]  [address] [phone]

 

 BRENDA GIBSON  Next Of Kin  [address]

[place]  [address] [phone]







Care Team Providers







 Care Team Member Name  Role  Phone

 

 NO, LOCAL PHYSICIAN  PCP  Unavailable







Insurance Providers







 Payer Name  Policy Number  Subscriber Name  Relationship

 

 Self Pay     Darion Moreno  18 Self / Same As Patient







Advance Directives







 Directive  Response  Recorded Date/Time

 

 Advance Directives  No  17 2:12am

 

 Health Care Power of   No  17 2:12am

 

 Organ Donor  Yes  17 2:12am

 

 Resuscitation Status  Full Code  17 2:12am







Chief Complaint and Reason for Visit







 Chief Complaint  INTRACTABLE RUQ PAIN, LEUKOCYTOSIS, CHOLELITHIASIS

 

 Reason for Visit  Cholelithiasis

Intractable abdominal pain

Leukocytosis







Problems

Active Problems







 Medical Problem  Onset Date  Status

 

 Bronchitis  Unknown  Acute

 

 Cholelithiasis  Unknown  Acute

 

 Intractable abdominal pain  Unknown  Acute

 

 Leukocytosis  Unknown  Acute







Medications

Current Home Medications







 Medication  Dose  Units  Route  Directions  Days/Qty  Instructions  Start Date

 

 Hydrocodone/Acetaminophen 1 Each  1-2  Tab  Oral  4-6HR as needed for Pain  30
     17





Past Home Medications







 Medication  Directions  Ordered  Status

 

 Acetaminophen/Hydrocodone Bitart (Norco) 1 Each Tablet, 0.5 - 1 Each Oral  Q 4 
- 6 Hrs Prn  13  Discontinued

 

 Penicillin V Potassium 250 Mg Tablet, 500 Mg Oral  Four Times Daily  13  
Discontinued

 

 Albuterol 8.5 Gm Hfa.aer.ad, 2 Puff Inhalation  Every 4HRS as needed for 
Wheezing  13  Discontinued

 

 Azithromycin 250 Mg Tablet, 1 Tab Oral  Daily  13  Discontinued







Social History







 Social History Problem  Response  Recorded Date/Time

 

 Alcohol Use  Denies Use  2013 10:00am

 

 Recreational Drug Use  No  2013 10:00am

 

 Recent Foreign Travel  No  2017 2:12am

 

 Recent Infectious Disease Exposure  No  2017 2:12am

 

 Hospitalization with Isolation  Denies  2017 6:50pm

 

 Smoking Status  Current Everyday Smoker  2017 2:12am

 

 Type Used  Cigarettes  2017 6:50pm

 

 Recent Hopitalizations  No  2017 2:12am

 

 Hospitalization with Isolation  Denies  2017 6:50pm









 Query  Response  Start Date  Stop Date

 

 Smoking Status  Current Everyday Smoker      







Hospital Discharge Instructions

 

Patient Instructions

Physician Instructions

 

New, Converted or Re-Newed RX:  RX on Chart

Plan of Care/Instructions/FU:  

incentive spirometry.  Dressings off in 48 hours.  Follow-up in 2 weeks

Activity as Tolerated:  Yes

Discharge Diet:  No Restrictions

 

 

Care Plan

Patient Instructions:: incentive spirometry.  Dressings off in 48 hours.  

Follow-up in 2 weeks

 



Plan of Care







 Discharge Date  17 6:41pm

 

 Disposition  01 HOME, SELF-CARE

 

 Instructions/Education Provided  Cholecystectomy, Laparoscopic Surgery

 

 Forms Provided  PDI Medical

PDI Surgical

 

 Prescriptions  See Medication Section

 

 Referrals  ETTA ANTOINE MD (Unspecified) - 17 

Address:

[address] [place] [address]

 [phone]



Reason(s) for Referral:

 AT 2:30 P.M.

 

 Additional Instructions/Education  SHOWER PAT DRY

 

 Care Plan and Goals  See Discharge Instructions Section







Functional Status







 Query  Response  Date Recorded

 

 Patient Orientation  Person

Place

Time

Situation

Eyes Open

  2017 6:50pm

 

 Comprehension Ability  Understands Concepts

  2017 2:12am







Allergies, Adverse Reactions, Alerts

No known allergies.



Immunizations







 Name  Given  Type

 

 FLU TRIvalent 5 years - Adult  17  Administered







Vital Signs

Acute Vital Signs





 Vital  Response  Date/Time

 

 Temperature (Fahrenheit)  95.5 degrees F (97.6 - 99.5)  2017 4:11pm

 

 Temperature (Calculated Celsius)  35.83952 degrees C (36.4 - 37.5)  2017 
4:11pm

 

 Temperature Source  Tympanic  2017 4:11pm

 

 Pulse Rate (adult)  58 bpm (60 - 90)  2017 4:11pm

 

 Respiratory Rate  20 bpm (12 - 24)  2017 4:11pm

 

 O2 Sat by Pulse Oximetry  98 % (88 - 100)  2017 4:11pm

 

 Blood Pressure  115/74 mm Hg  2017 4:11pm

 

    Blood Pressure Mean  88 mm Hg  2017 4:11pm

 

 Pain      

 

    Numeric Pain Scale  0-No Pain  2017 4:11pm

 

 Height (Feet)  5 feet  2017 2:12am

 

 Height (Inches)  9.00 inches  2017 2:12am

 

 Height (Calculated Centimeters)  175.915976 cm  2017 2:12am

 

 Weight (Pounds)  274 pounds  2017 2:12am

 

 Weight (Ounces)  2.0 oz  2017 2:12am

 

 Weight (Calculated Grams)  989318.01 gm  2017 2:12am

 

 Weight (Calculated Kilograms)  124.642840 kilograms  2017 2:12am

 

 Calculated BMI  40.5  2017 2:12am

 

 Capillary Refill      

 

    Capillary Refill  Less Than 3 Seconds  2017 10:22pm







Results

Laboratory Results







 Test Name  Result  Units  Flags  Reference  Collection Date/Time  Result Date/
Time  Comments

 

 White Blood Count  15.6  10^3/uL  H  4.3-11.0  2017 5:30am  2017 5:
53am   

 

 Red Blood Count  4.99  10^6/uL     4.35-5.85  2017 5:30am  2017 5:
53am   

 

 Hemoglobin  14.5  G/DL     13.3-17.7  2017 5:30am  2017 5:53am   

 

 Hematocrit  41  %     40-54  2017 5:30am  2017 5:53am   

 

 Mean Corpuscular Volume  83  FL     80-99  2017 5:30am  2017 5:
53am   

 

 Mean Corpuscular Hemoglobin  29  PG     25-34  2017 5:30am  2017 5:
53am   

 

 Mean Corpuscular Hemoglobin Concent  35  G/DL     32-36  2017 5:302017 5:53am   

 

 Red Cell Distribution Width  12.3  %     10.0-14.5  2017 5:302017 5:53am   

 

 Platelet Count  275  10^3/uL     130-400  2017 5:302017 5:53am
   

 

 Mean Platelet Volume  10.8  FL  H  7.4-10.4  2017 5:30am  2017 5:
53am   

 

 Neutrophils (%) (Auto)  80  %  H  42-75  2017 5:30am  2017 5:53am 
  

 

 Lymphocytes (%) (Auto)  15  %     12-44  2017 5:302017 5:53am 
  

 

 Monocytes (%) (Auto)  5  %     0-12  2017 5:30am  2017 5:53am   

 

 Eosinophils (%) (Auto)  0  %     0-10  2017 5:30am  2017 5:53am   

 

 Basophils (%) (Auto)  0  %     0-10  2017 5:30am  2017 5:53am   

 

 Neutrophils # (Auto)  12.4  X 10^3  H  1.8-7.8  2017 5:30am  2017 5
:53am   

 

 Lymphocytes # (Auto)  2.4  X 10^3     1.0-4.0  2017 5:302017 5:
53am   

 

 Monocytes # (Auto)  0.8  X 10^3     0.0-1.0  2017 5:302017 5:
53am   

 

 Eosinophils # (Auto)  0.0  10^3/uL     0.0-0.3  2017 5:30am  2017 5
:53am   

 

 Basophils # (Auto)  0.0  10^3/uL     0.0-0.1  2017 5:30am  2017 5:
53am   

 

 Neutrophils % (Manual)  77  %        2017 11:30pm  2017 12:07am   

 

 Band Neutrophils  4  %        2017 11:30pm  2017 12:07am   

 

 Lymphocytes % (Manual)  11  %        2017 11:30pm  2017 12:07am   

 

 Monocytes % (Manual)  2  %        2017 11:30pm  2017 12:07am   

 

 Eosinophils % (Manual)  0  %        2017 11:30pm  2017 12:07am   

 

 Basophils % (Manual)  0  %        2017 11:30pm  2017 12:07am   

 

 Reactive Lymphocytes  6  %        2017 11:30pm  2017 12:07am   

 

 Blood Morphology Comment  NORMAL           2017 11:30pm  2017 12:
07am   

 

 Urine Color  YELLOW           2017 8:45am   

 

 Urine Clarity  CLEAR           2017 8:45am   

 

 Urine pH  5        5-9  2017 8:45am   

 

 Urine Specific Gravity  1.015     *  1.016-1.022  2017 8:
45am   

 

 Urine Protein  1+     *  NEGATIVE  2017 8:45am   

 

 Urine Glucose (UA)  NEGATIVE        NEGATIVE  2017 8:45am   

 

 Urine RBC (Auto)  NEGATIVE        NEGATIVE  2017 8:45am   

 

 Urine Ketones  NEGATIVE        NEGATIVE  2017 8:45am   

 

 Urine Nitrite  NEGATIVE        NEGATIVE  2017 8:45am   

 

 Urine Bilirubin  NEGATIVE        NEGATIVE  2017 8:45am   

 

 Urine Urobilinogen  NORMAL  MG/DL     NORMAL  2017 8:45am   

 

 Urine Leukocyte Esterase  NEGATIVE        NEGATIVE  2017 8:
45am   

 

 Urine RBC  NONE  /HPF        2017 8:45am   

 

 Urine WBC  NONE  /HPF        2017 8:45am   

 

 Urine Bacteria  NEGATIVE  /HPF        2017 8:45am   

 

 Urine Squamous Epithelial Cells  5-10  /HPF        2017 8:
45am   

 

 Urine Crystals  NONE  /LPF        2017 8:45am   

 

 Urine Casts  NONE  /LPF        2017 8:45am   

 

 Urine Mucus  NEGATIVE  /LPF        2017 8:45am   

 

 Urine Culture Indicated  NO           2017 8:45am   

 

 Sodium Level  139  MMOL/L     135-145  2017 5:30am  2017 6:36am   

 

 Potassium Level  4.0  MMOL/L     3.6-5.0  2017 5:30am  2017 6:36am
   

 

 Chloride Level  106  MMOL/L       2017 5:30am  2017 6:36am   

 

 Carbon Dioxide Level  23  MMOL/L     21-32  2017 5:30am  2017 6:
36am   

 

 Anion Gap  10  MMOL/L     5-14  2017 5:30am  2017 6:36am   

 

 Blood Urea Nitrogen  7  MG/DL     7-18  2017 5:30am  2017 6:36am   

 

 Creatinine  0.83  MG/DL     0.60-1.30  2017 5:30am  2017 6:36am   

 

 BUN/Creatinine Ratio  8           2017 5:30am  2017 6:36am   

 

 Estimat Glomerular Filtration Rate  > 60           2017 5:30am  2017 6:36am                    GFR INTERPRETIVE DATA  

  

         UNITS FOR ESTIMATED GFR (eGFR): mL/min/1.73 M2  

         REFERENCE RANGE FOR ESTIMATED GFR (eGFR)  

                                          eGFR  

         NORMAL eGFR                       >60  

         MODERATELY DECREASED eGFR          30-59  

         SEVERLY DECREASED eGFR             15-29  

         KIDNEY FAILURE                    <15 (OR DIALYSIS)  

 

 Glucose Level  112  MG/DL  H    2017 5:30am  2017 6:36am   

 

 Calcium Level  8.3  MG/DL  L  8.5-10.1  2017 5:30am  2017 6:36am   

 

 Total Bilirubin  0.5  MG/DL     0.1-1.0  2017 5:30am  2017 6:36am 
  

 

 Alkaline Phosphatase  72  U/L       2017 5:30am  2017 6:36am
   

 

 Aspartate Amino Transf (AST/SGOT)  21  U/L     5-34  2017 5:30am  2017 6:36am   

 

 Alanine Aminotransferase (ALT/SGPT)  26  U/L     0-55  2017 5:30am   6:36am   

 

 Total Protein  6.4  G/DL     6.4-8.2  2017 5:30am  2017 6:36am   

 

 Albumin  3.7  G/DL     3.2-4.5  2017 5:30am  2017 6:36am   

 

 Amylase Level  22  U/L  L    2017 5:30am  2017 7:10am   

 

 Lipase  12  U/L     8-78  2017 5:30am  2017 7:10am   







Procedures







 Procedure  Status  Date  Provider(s)

 

 Robot-assisted laparoscopic cholecystectomy  Completed  17  ETTA ANTOINE MD







Encounters







 Encounter  Location  Arrival/Admit Date  Discharge/Depart Date  Attending 
Provider

 

 Discharged Inpatient (obs)  [facility]  17 1:13am   6:41pm  ETTA ANTOINE MD











 Recent Diagnosis

 

 Cholelithiasis

 

 Intractable abdominal pain

 

 Leukocytosis

## 2018-01-27 ENCOUNTER — HOSPITAL ENCOUNTER (EMERGENCY)
Dept: HOSPITAL 75 - ER | Age: 33
Discharge: HOME | End: 2018-01-27
Payer: SELF-PAY

## 2018-01-27 VITALS — SYSTOLIC BLOOD PRESSURE: 110 MMHG | DIASTOLIC BLOOD PRESSURE: 63 MMHG

## 2018-01-27 VITALS — BODY MASS INDEX: 31.15 KG/M2 | WEIGHT: 230 LBS | HEIGHT: 72 IN

## 2018-01-27 DIAGNOSIS — Z87.01: ICD-10-CM

## 2018-01-27 DIAGNOSIS — R07.89: ICD-10-CM

## 2018-01-27 DIAGNOSIS — J98.01: ICD-10-CM

## 2018-01-27 DIAGNOSIS — B34.9: ICD-10-CM

## 2018-01-27 DIAGNOSIS — Z82.49: ICD-10-CM

## 2018-01-27 DIAGNOSIS — Z87.891: ICD-10-CM

## 2018-01-27 DIAGNOSIS — J11.1: Primary | ICD-10-CM

## 2018-01-27 PROCEDURE — 94640 AIRWAY INHALATION TREATMENT: CPT

## 2018-01-27 PROCEDURE — 93005 ELECTROCARDIOGRAM TRACING: CPT

## 2018-01-27 PROCEDURE — 71046 X-RAY EXAM CHEST 2 VIEWS: CPT

## 2018-01-27 NOTE — ED CHEST PAIN
General


Chief Complaint:  Chest Pain


Stated Complaint:  FLU, SHARP CP, SOB


Nursing Triage Note:  


pt reports cough/cold flu s/s x 4 days. reports developed worsening cp since 


yesterday. States CP is worse with inspiration.


Nursing Sepsis Screen:  No Definite Risk


Source:  patient


Exam Limitations:  no limitations





History of Present Illness


Date Seen by Provider:  2018


Time Seen by Provider:  07:57


Initial Comments


Patient presented to the emergency room with primary complaint of chest pain.  

EKG was obtained by nursing staff at triage.  This reports family members have 

had influenza.  He has developed flulike symptoms over the past 2 days 

including cough, chest pain with cough and breathing, generalized myalgia and 

fatigue.  He took Excedrin, ibuprofen and NyQuil which were not very helpful.  

He denies any history of cardiopulmonary problems.  He has a tightness in his 

chest.





Allergies and Home Medications


Allergies


Coded Allergies:  


     No Known Drug Allergies (Unverified , 13)





Home Medications


Albuterol Sulfate 1 Puff Puff, 1-4 PUFF IH Q4H PRN for SHORTNESS OF BREATH, #1


   1 PUFF = 90 MCG 


   Prescribed by: ALEXANDREA MELVIN on 18 0921


Hydrocodone Bit/Acetaminophen 1 Each Tablet, 1-2 TAB PO 4-6HR PRN for PAIN, #30


   Prescribed by: ETTA ANTOINE on 3/6/17 1417


Oseltamivir Phosphate 75 Mg Cap, 75 MG PO BID, #10


   Prescribed by: ALEXANDREA MELVIN on 18 0921





Review of Systems


Constitutional:  see HPI


EENTM:  No Symptoms Reported


Respiratory:  See HPI


Cardiovascular:  No Symptoms Reported


Gastrointestinal:  No Symptoms Reported


Genitourinary:  No Symptoms Reported


Musculoskeletal:  no symptoms reported


Skin:  no symptoms reported


Psychiatric/Neurological:  No Symptoms Reported


Endocrine:  No Symptoms Reported





Past Medical-Social-Family Hx


Patient Social History


Alcohol Use:  Denies Use


Recreational Drug Use:  No


Smoking Status:  Former Smoker


Type Used:  Cigarettes


Former Smoker, Quit:  2017


2nd Hand Smoke Exposure:  No


Recent Foreign Travel:  No


Contact w/Someone Who Travel:  No


Recent Infectious Disease Expo:  No


Recent Hopitalizations:  No


Physical Abuse:  No


Sexual Abuse:  No


Mistreated:  No


Fear:  No





Immunizations Up To Date


Tetanus Booster (TDap):  Unknown


PED Vaccines UTD:  No





Seasonal Allergies


Seasonal Allergies:  No





Surgeries


History of Surgeries:  Yes


Surgeries:  Gallbladder





Respiratory


History of Respiratory Disorde:  No


Respiratory Disorders:  Pneumonia





Cardiovascular


History of Cardiac Disorders:  No





Neurological


History of Neurological Disord:  No





Reproductive System


Hx Reproductive Disorders:  No





Genitourinary


History of Genitourinary Disor:  No





Gastrointestinal


History of Gastrointestinal Di:  No





Musculoskeletal


History of Musculoskeletal Dis:  No





Endocrine


History of Endocrine Disorders:  No





HEENT


History of HEENT Disorders:  No





Cancer


History of Cancer:  No





Psychosocial


History of Psychiatric Problem:  No


Suicide Risk Score:  0





Integumentary


History of Skin or Integumenta:  No





Blood Transfusions


History of Blood Disorders:  No


Adverse Reaction to a Blood Tr:  No





Family Medical History


Significant Family History:  Heart Disease, COPD


Family Medial History:  


FH: congestive heart failure


  19 FATHER





Physical Exam


Vital Signs





Vital Sign - Last 12Hours








 18





 08:06 08:21


 


Temp  98.0


 


Pulse  84


 


Resp  20


 


B/P (MAP)  107/66 (80)


 


Pulse Ox  95


 


O2 Delivery Room Air 





Capillary Refill : Less Than 3 Seconds


General Appearance:  No Apparent Distress, WD/WN


HEENT:  PERRL/EOMI, TMs Normal, Normal ENT Inspection, Pharynx Normal


Neck:  Normal Inspection


Respiratory:  No Accessory Muscle Use, No Respiratory Distress, Decreased 

Breath Sounds, Wheezing


Cardiovascular:  Regular Rate, Rhythm, No Edema, Normal Peripheral Pulses


Gastrointestinal:  Normal Bowel Sounds, Non Tender, Soft


Extremity:  Normal Inspection, No Pedal Edema


Neurologic/Psychiatric:  Alert, Oriented x3, No Motor/Sensory Deficits, Normal 

Mood/Affect, CNs II-XII Norm as Tested


Skin:  Normal Color, Warm/Dry





Progress/Results/Core Measures


Results/Orders


My Orders





Orders - ALEXANDRAE SMITH MD


Saline Lock/Iv-Start (18 08:18)


Ekg Tracing (18 08:18)


Chest Pa/Lat (2 View) (18 08:18)


Albuterol/Ipra Inhalation Soln (Duoneb I (18 08:30)


Svn Sm Volume Nebulizer Rt-Rfs (18 08:18)


Ketorolac Injection (Toradol Injection) (18 08:30)





Medications Given in ED





Vital Signs/I&O





Vital Sign - Last 12Hours








 1/27/18 18





 08:06 08:21 08:29 09:42


 


Temp  98.0  


 


Pulse  84  89


 


Resp  20  20


 


B/P (MAP)  107/66 (80)  


 


Pulse Ox  95 96 97


 


O2 Delivery Room Air   














Blood Pressure Mean:  80








Progress Note :  


Progress Note


Chest x-ray and EKG were normal.  Patient is presumed to have influenza due to 

his symptoms and exposures.  A DuoNeb treatment helped with the chest 

tightness.  Toradol was given for pain.  He is prescribed an inhaler and 

Tamiflu.





ECG


Initial ECG Impression Date:  2018


Initial ECG Impression Time:  07:53


Initial ECG Rate:  81


Initial ECG Rhythm:  Normal Sinus


Initial ECG Intervals:  Normal


Initial ECG Impression:  Normal


Comment


Normal sinus rhythm with no ST elevation or depression.  No abnormal intervals 

or axis deviation.





Diagnostic Imaging





   Diagonstic Imaging:  Xray


   Plain Films/CT/US/NM/MRI:  chest


Comments


Chest x-ray viewed by me and report reviewed.  See report below:





NAME:   MITCHEL FUENTES  


[facility] REC#:   [id number]  


ACCOUNT#:   [id number]  


PT STATUS:   REG ER  


:   1985  


PHYSICIAN:   ALEXANDREA SMITH MD  


ADMIT DATE:   18/ER  


 ***Draft***  


Date of Exam:18  


 


CHEST PA/LAT (2 VIEW)  


 


INDICATION: Chest pain  


 


 COMPARISON: 2013  


 


 FINDINGS:    


 Frontal and lateral views of the chest demonstrate clear lungs  


 bilaterally. The heart is normal. There is no pneumothorax.  


 Osseous structures are normal.  


 


 IMPRESSION:  


 


 Negative chest.  


 


   Dictated on workstation # [id number]  


 


Dict:   [id number]  


Trans:   18 0904  


[facility] [id number]  


 


Interpreted by:     BETTIE CORREIA





Departure


Impression


Impression:  


 Primary Impression:  


 Flu-like symptoms


 Additional Impressions:  


 Atypical chest pain


 Acute bronchospasm due to viral infection


Disposition:   HOME, SELF-CARE


Condition:  Improved





Departure-Patient Inst.


Decision time for Depature:  09:05


Referrals:  


NO,LOCAL PHYSICIAN (PCP/Family)


Primary Care Physician


Patient Instructions:  BRONCHOSPASM-ADULT, Flu





Add. Discharge Instructions:  


Drink plenty of clear liquids.  You may take ibuprofen up to 600 mg every 6 

hours as needed for pain.  Add Tylenol (acetaminophen) up to 1000 mg every 6 

hours as needed for additional pain relief.  Complete Tamiflu as prescribed.  

Start it immediately if you choose to take it.  Return to care if symptoms 

worsen.  Use your inhaler up to 4 puffs and a four-hour period of time as 

needed for shortness of breath and chest tightness.











All discharge instructions reviewed with patient and/or family. Voiced 

understanding.


Scripts


Albuterol Sulfate (PROAIR HFA) 1 Puff Puff


1-4 PUFF IH Q4H Y for SHORTNESS OF BREATH, #1 PUFF


   1 PUFF = 90 MCG


   Prov: ALEXANDREA SMITH MD         18 


Oseltamivir Phosphate (Tamiflu) 75 Mg Cap


75 MG PO BID, #10 CAP


   Prov: ALEXANDREA SMITH MD         18


Work/School Note:  Work Release Form   Date Seen in the Emergency Department:  

2018


   Return to Work:  2018


   Restrictions:  Return-No Fever (24hrs)











ALEXANDREA SMITH MD 2018 08:44

## 2018-01-27 NOTE — DIAGNOSTIC IMAGING REPORT
INDICATION: Chest pain



COMPARISON: 12/17/2013



FINDINGS:  

Frontal and lateral views of the chest demonstrate clear lungs

bilaterally. The heart is normal. There is no pneumothorax.

Osseous structures are normal.



IMPRESSION:



Negative chest.



Dictated by: 



  Dictated on workstation # [id number]

## 2018-01-30 ENCOUNTER — HOSPITAL ENCOUNTER (INPATIENT)
Dept: HOSPITAL 75 - ER | Age: 33
Discharge: HOME | DRG: 195 | End: 2018-01-30
Attending: FAMILY MEDICINE | Admitting: FAMILY MEDICINE
Payer: SELF-PAY

## 2018-01-30 VITALS — BODY MASS INDEX: 38.95 KG/M2 | WEIGHT: 257 LBS | HEIGHT: 68 IN

## 2018-01-30 VITALS — DIASTOLIC BLOOD PRESSURE: 66 MMHG | SYSTOLIC BLOOD PRESSURE: 125 MMHG

## 2018-01-30 VITALS — SYSTOLIC BLOOD PRESSURE: 125 MMHG | DIASTOLIC BLOOD PRESSURE: 66 MMHG

## 2018-01-30 VITALS — DIASTOLIC BLOOD PRESSURE: 61 MMHG | SYSTOLIC BLOOD PRESSURE: 110 MMHG

## 2018-01-30 DIAGNOSIS — J11.00: Primary | ICD-10-CM

## 2018-01-30 DIAGNOSIS — F17.210: ICD-10-CM

## 2018-01-30 LAB
ALBUMIN SERPL-MCNC: 3.3 GM/DL (ref 3.2–4.5)
ALP SERPL-CCNC: 91 U/L (ref 40–136)
ALT SERPL-CCNC: 22 U/L (ref 0–55)
BASOPHILS # BLD AUTO: 0.1 10^3/UL (ref 0–0.1)
BASOPHILS NFR BLD AUTO: 0 % (ref 0–10)
BILIRUB SERPL-MCNC: 0.9 MG/DL (ref 0.1–1)
BUN/CREAT SERPL: 6
CALCIUM SERPL-MCNC: 9 MG/DL (ref 8.5–10.1)
CHLORIDE SERPL-SCNC: 93 MMOL/L (ref 98–107)
CO2 SERPL-SCNC: 28 MMOL/L (ref 21–32)
CREAT SERPL-MCNC: 0.94 MG/DL (ref 0.6–1.3)
EOSINOPHIL # BLD AUTO: 0.1 10^3/UL (ref 0–0.3)
EOSINOPHIL NFR BLD AUTO: 0 % (ref 0–10)
ERYTHROCYTE [DISTWIDTH] IN BLOOD BY AUTOMATED COUNT: 12.9 % (ref 10–14.5)
GFR SERPLBLD BASED ON 1.73 SQ M-ARVRAT: > 60 ML/MIN
GLUCOSE SERPL-MCNC: 126 MG/DL (ref 70–105)
HCT VFR BLD CALC: 36 % (ref 40–54)
HGB BLD-MCNC: 12.5 G/DL (ref 13.3–17.7)
LYMPHOCYTES # BLD AUTO: 1.4 X 10^3 (ref 1–4)
LYMPHOCYTES NFR BLD AUTO: 6 % (ref 12–44)
MANUAL DIFFERENTIAL PERFORMED BLD QL: YES
MCH RBC QN AUTO: 29 PG (ref 25–34)
MCHC RBC AUTO-ENTMCNC: 35 G/DL (ref 32–36)
MCV RBC AUTO: 82 FL (ref 80–99)
MONOCYTES # BLD AUTO: 1.7 X 10^3 (ref 0–1)
MONOCYTES NFR BLD AUTO: 8 % (ref 0–12)
MONOCYTES NFR BLD: 3 %
NEUTROPHILS # BLD AUTO: 19.4 X 10^3 (ref 1.8–7.8)
NEUTROPHILS NFR BLD AUTO: 86 % (ref 42–75)
NEUTS BAND NFR BLD MANUAL: 82 %
NEUTS BAND NFR BLD: 6 %
PLATELET # BLD: 324 10^3/UL (ref 130–400)
PMV BLD AUTO: 10.1 FL (ref 7.4–10.4)
POTASSIUM SERPL-SCNC: 3.1 MMOL/L (ref 3.6–5)
PROT SERPL-MCNC: 7.4 GM/DL (ref 6.4–8.2)
RBC # BLD AUTO: 4.39 10^6/UL (ref 4.35–5.85)
RBC MORPH BLD: NORMAL
SODIUM SERPL-SCNC: 134 MMOL/L (ref 135–145)
VARIANT LYMPHS NFR BLD MANUAL: 9 %
WBC # BLD AUTO: 22.6 10^3/UL (ref 4.3–11)

## 2018-01-30 PROCEDURE — 85007 BL SMEAR W/DIFF WBC COUNT: CPT

## 2018-01-30 PROCEDURE — 94640 AIRWAY INHALATION TREATMENT: CPT

## 2018-01-30 PROCEDURE — 85027 COMPLETE CBC AUTOMATED: CPT

## 2018-01-30 PROCEDURE — 87040 BLOOD CULTURE FOR BACTERIA: CPT

## 2018-01-30 PROCEDURE — 80053 COMPREHEN METABOLIC PANEL: CPT

## 2018-01-30 PROCEDURE — 87070 CULTURE OTHR SPECIMN AEROBIC: CPT

## 2018-01-30 PROCEDURE — 96375 TX/PRO/DX INJ NEW DRUG ADDON: CPT

## 2018-01-30 PROCEDURE — 36415 COLL VENOUS BLD VENIPUNCTURE: CPT

## 2018-01-30 PROCEDURE — 83605 ASSAY OF LACTIC ACID: CPT

## 2018-01-30 PROCEDURE — 71046 X-RAY EXAM CHEST 2 VIEWS: CPT

## 2018-01-30 PROCEDURE — 96365 THER/PROPH/DIAG IV INF INIT: CPT

## 2018-01-30 PROCEDURE — 87205 SMEAR GRAM STAIN: CPT

## 2018-01-30 NOTE — DIAGNOSTIC IMAGING REPORT
INDICATION: Shortness of breath.



Comparison is made with prior examination from 01/27/2018.



FINDINGS: Heart size is normal. There is right perihilar

infiltrate. There is no pleural effusion or pneumothorax. The

mediastinum is unremarkable.



IMPRESSION: Right perihilar infiltrate suspect for early

pneumonia. Recommend clinical correlation.



Dictated by: 



  Dictated on workstation # [id number]

## 2018-01-30 NOTE — XMS REPORT
Continuity of Care Document

 Created on: 2018



MITCHEL FUENTES

External Reference #: [id number]

: 1985

Sex: Male



Demographics







 Address  [address] [place]  [address]

 

 Home Phone  [phone] x

 

 Preferred Language  Unknown

 

 Marital Status  Unknown

 

 [employer] Affiliation  Unknown

 

 Race  Unknown

 

 Ethnic Group  Unknown





Author







 Author  [facility]

 

 Organization  [facility]

 

 Address  Unknown

 

 Phone  Unavailable



              



Allergies

      





 Active            Description            Code            Type            
Severity            Reaction            Onset            Reported/Identified   
         Relationship to Patient            Clinical Status        

 

 Yes            No Known Drug Allergies            [id number]            Drug 
Allergy            Unknown            N/A                         2013   
                               



                  



Medications

      



There is no data.                  



Problems

      





 Date Dx Coded            Attending            Type            Code            
Diagnosis            Diagnosed By        

 

 2013            RK BENITEZ MD            Ot            719.41  
          JOINT PAIN-SHLDER                     

 

 2013            RK BENITEZ MD            Ot            959.2   
         SHLDR/UPPER ARM INJ NOS                     

 

 2013            RK BENITEZ MD            Ot            E000.8  
          OTHER EXTERNAL CAUSE STATUS                     

 

 2013            RK BENITEZ MD            Ot            E849.4  
          ACCID IN RECREATION AREA                     

 

 2013            RK BENITEZ MD            Ot            E928.9  
          ACCIDENT NOS                     

 

 2013            SINA DICK DO            Ot            522.5    
        PERIAPICAL ABSCESS                     

 

 2013            SINA DICK DO            Ot            525.9    
        DENTAL DISORDER NOS                     

 

 2013            SARAH LEDEZMA, ALEXANDREA SCHULTZ            Ot            490   
         BRONCHITIS NOS                     

 

 2013            SARAH LEDEZMA, ALEXANDREA SCHULTZ            Ot            786.2 
           COUGH                     

 

 2017            ETTA ANTOINE MD            Ot            F17.210   
         NICOTINE DEPENDENCE, CIGARETTES, UNCOMPL                     

 

 2017            ETTA ANTOINE MD            Ot            K80.12    
        CALCULUS OF GB W ACUTE AND CHRONIC JAMAAL                     

 

 2017            ETTA ANTOINE MD            Ot            F17.210   
         NICOTINE DEPENDENCE, CIGARETTES, UNCOMPL                     

 

 2017            ETTA ANTOINE MD            Ot            K80.12    
        CALCULUS OF GB W ACUTE AND CHRONIC JAMAAL                     

 

 2017            ETTA ANTOINE MD            Ot            F17.210   
         NICOTINE DEPENDENCE, CIGARETTES, UNCOMPL                     

 

 2017            ETTA ANTOINE MD            Ot            K80.12    
        CALCULUS OF GB W ACUTE AND CHRONIC JAMAAL                     



                                              



Procedures

      



There is no data.                  



Results

      





 Test            Result            Range        









 Complete urinalysis with reflex to culture - 17 00:00         









 Urine color determination            YELLOW             NRG        

 

 Urine clarity determination            CLEAR             NRG        

 

 Urine pH measurement by test strip            5             5-9        

 

 Specific gravity of urine by test strip            1.015             1.016-
1.022        

 

 Urine protein assay by test strip, semi-quantitative            1+             
NEGATIVE        

 

 Urine glucose detection by automated test strip            NEGATIVE           
  NEGATIVE        

 

 Erythrocytes detection in urine sediment by light microscopy            
NEGATIVE             NEGATIVE        

 

 Urine ketones detection by automated test strip            NEGATIVE           
  NEGATIVE        

 

 Urine nitrite detection by test strip            NEGATIVE             NEGATIVE
        

 

 Urine total bilirubin detection by test strip            NEGATIVE             
NEGATIVE        

 

 Urine urobilinogen measurement by automated test strip (mass/volume)          
  NORMAL             NORMAL        

 

 Urine leukocyte esterase detection by dipstick            NEGATIVE             
NEGATIVE        

 

 Automated urine sediment erythrocyte count by microscopy (number/high power 
field)            NONE             NRG        

 

 Automated urine sediment leukocyte count by microscopy (number/high power field
)            NONE             NRG        

 

 Bacteria detection in urine sediment by light microscopy            NEGATIVE  
           NRG        

 

 Squamous epithelial cells detection in urine sediment by light microscopy     
       5-10             NRG        

 

 Crystals detection in urine sediment by light microscopy            NONE      
       NRG        

 

 Casts detection in urine sediment by light microscopy            NONE         
    NRG        

 

 Mucus detection in urine sediment by light microscopy            NEGATIVE     
        NRG        

 

 Complete urinalysis with reflex to culture            NO             NRG      
  









 Complete blood count (CBC) with automated white blood cell (WBC) differential 
- 17 23:30         









 Blood leukocytes automated count (number/volume)            23.0 10*3/uL      
      4.3-11.0        

 

 Blood erythrocytes automated count (number/volume)            5.29 10*6/uL    
        4.35-5.85        

 

 Venous blood hemoglobin measurement (mass/volume)            15.1 g/dL        
    13.3-17.7        

 

 Blood hematocrit (volume fraction)            44 %            40-54        

 

 Automated erythrocyte mean corpuscular volume            82 [foz_us]          
  80-99        

 

 Automated erythrocyte mean corpuscular hemoglobin (mass per erythrocyte)      
      29 pg            25-34        

 

 Automated erythrocyte mean corpuscular hemoglobin concentration measurement (
mass/volume)            35 g/dL            32-36        

 

 Automated erythrocyte distribution width ratio            12.2 %            
10.0-14.5        

 

 Automated blood platelet count (count/volume)            336 10*3/uL          
  130-400        

 

 Automated blood platelet mean volume measurement            10.7 [foz_us]     
       7.4-10.4        

 

 Automated blood neutrophils/100 leukocytes            87 %            42-75   
     

 

 Automated blood lymphocytes/100 leukocytes            9 %            12-44    
    

 

 Blood monocytes/100 leukocytes            4 %            0-12        

 

 Automated blood eosinophils/100 leukocytes            0 %            0-10     
   

 

 Automated blood basophils/100 leukocytes            0 %            0-10        

 

 Blood neutrophils automated count (number/volume)            20.1 10*3        
    1.8-7.8        

 

 Blood lymphocytes automated count (number/volume)            2.1 10*3         
   1.0-4.0        

 

 Blood monocytes automated count (number/volume)            0.8 10*3            
0.0-1.0        

 

 Automated eosinophil count            0.0 10*3/uL            0.0-0.3        

 

 Automated blood basophil count (count/volume)            0.0 10*3/uL          
  0.0-0.1        









 Blood manual differential performed detection - 17 23:30         









 Blood monocytes/100 leukocytes            2 %            NRG        

 

 Manual blood segmented neutrophils/100 leukocytes            77 %            
NRG        

 

 Blood band neutrophils/100 leukocytes            4 %            NRG        

 

 Manual blood lymphocytes/100 leukocytes            11 %            NRG        

 

 Manual eosinophils/100 leukocytes in nose            0 %            NRG        

 

 Manual blood basophils/100 leukocytes            0 %            NRG        

 

 Blood lymphocytes variant/100 leukocytes            6 %            NRG        

 

 Blood erythrocyte morphology finding identification            NORMAL         
    [facility]        









 Comprehensive metabolic panel - 17 23:30         









 Serum or plasma sodium measurement (moles/volume)            138 mmol/L       
     135-145        

 

 Serum or plasma potassium measurement (moles/volume)            3.6 mmol/L    
        3.6-5.0        

 

 Serum or plasma chloride measurement (moles/volume)            103 mmol/L     
               

 

 Carbon dioxide            24 mmol/L            21-32        

 

 Serum or plasma anion gap determination (moles/volume)            11 mmol/L   
         5-14        

 

 Serum or plasma urea nitrogen measurement (mass/volume)            9 mg/dL    
        7-18        

 

 Serum or plasma creatinine measurement (mass/volume)            1.01 mg/dL    
        0.60-1.30        

 

 Serum or plasma urea nitrogen/creatinine mass ratio            9             
NRG        

 

 Serum or plasma creatinine measurement with calculation of estimated 
glomerular filtration rate            >             NRG        

 

 Serum or plasma glucose measurement (mass/volume)            122 mg/dL        
            

 

 Serum or plasma calcium measurement (mass/volume)            9.0 mg/dL        
    8.5-10.1        

 

 Serum or plasma total bilirubin measurement (mass/volume)            0.4 mg/dL
            0.1-1.0        

 

 Serum or plasma alkaline phosphatase measurement (enzymatic activity/volume)  
          82 U/L                    

 

 Serum or plasma aspartate aminotransferase measurement (enzymatic activity/
volume)            24 U/L            5-34        

 

 Serum or plasma alanine aminotransferase measurement (enzymatic activity/volume
)            30 U/L            0-55        

 

 Serum or plasma protein measurement (mass/volume)            7.3 g/dL         
   6.4-8.2        

 

 Serum or plasma albumin measurement (mass/volume)            4.2 g/dL         
   3.2-4.5        









 Lipase - 17 23:30         









 Lipase            24 U/L            8-78        









 Complete blood count (CBC) with automated white blood cell (WBC) differential 
- 17 05:30         









 Blood leukocytes automated count (number/volume)            15.6 10*3/uL      
      4.3-11.0        

 

 Blood erythrocytes automated count (number/volume)            4.99 10*6/uL    
        4.35-5.85        

 

 Venous blood hemoglobin measurement (mass/volume)            14.5 g/dL        
    13.3-17.7        

 

 Blood hematocrit (volume fraction)            41 %            40-54        

 

 Automated erythrocyte mean corpuscular volume            83 [foz_us]          
  80-99        

 

 Automated erythrocyte mean corpuscular hemoglobin (mass per erythrocyte)      
      29 pg            25-34        

 

 Automated erythrocyte mean corpuscular hemoglobin concentration measurement (
mass/volume)            35 g/dL            32-36        

 

 Automated erythrocyte distribution width ratio            12.3 %            
10.0-14.5        

 

 Automated blood platelet count (count/volume)            275 10*3/uL          
  130-400        

 

 Automated blood platelet mean volume measurement            10.8 [foz_us]     
       7.4-10.4        

 

 Automated blood neutrophils/100 leukocytes            80 %            42-75   
     

 

 Automated blood lymphocytes/100 leukocytes            15 %            12-44   
     

 

 Blood monocytes/100 leukocytes            5 %            0-12        

 

 Automated blood eosinophils/100 leukocytes            0 %            0-10     
   

 

 Automated blood basophils/100 leukocytes            0 %            0-10        

 

 Blood neutrophils automated count (number/volume)            12.4 10*3        
    1.8-7.8        

 

 Blood lymphocytes automated count (number/volume)            2.4 10*3         
   1.0-4.0        

 

 Blood monocytes automated count (number/volume)            0.8 10*3            
0.0-1.0        

 

 Automated eosinophil count            0.0 10*3/uL            0.0-0.3        

 

 Automated blood basophil count (count/volume)            0.0 10*3/uL          
  0.0-0.1        









 Comprehensive metabolic panel - 17 05:30         









 Serum or plasma sodium measurement (moles/volume)            139 mmol/L       
     135-145        

 

 Serum or plasma potassium measurement (moles/volume)            4.0 mmol/L    
        3.6-5.0        

 

 Serum or plasma chloride measurement (moles/volume)            106 mmol/L     
               

 

 Carbon dioxide            23 mmol/L            21-32        

 

 Serum or plasma anion gap determination (moles/volume)            10 mmol/L   
         5-14        

 

 Serum or plasma urea nitrogen measurement (mass/volume)            7 mg/dL    
        7-18        

 

 Serum or plasma creatinine measurement (mass/volume)            0.83 mg/dL    
        0.60-1.30        

 

 Serum or plasma urea nitrogen/creatinine mass ratio            8             
NRG        

 

 Serum or plasma creatinine measurement with calculation of estimated 
glomerular filtration rate            >             NRG        

 

 Serum or plasma glucose measurement (mass/volume)            112 mg/dL        
            

 

 Serum or plasma calcium measurement (mass/volume)            8.3 mg/dL        
    8.5-10.1        

 

 Serum or plasma total bilirubin measurement (mass/volume)            0.5 mg/dL
            0.1-1.0        

 

 Serum or plasma alkaline phosphatase measurement (enzymatic activity/volume)  
          72 U/L                    

 

 Serum or plasma aspartate aminotransferase measurement (enzymatic activity/
volume)            21 U/L            5-34        

 

 Serum or plasma alanine aminotransferase measurement (enzymatic activity/volume
)            26 U/L            0-55        

 

 Serum or plasma protein measurement (mass/volume)            6.4 g/dL         
   6.4-8.2        

 

 Serum or plasma albumin measurement (mass/volume)            3.7 g/dL         
   3.2-4.5        









 Serum or plasma amylase measurement (enzymatic activity/volume) - 17 05:
30         









 Serum or plasma amylase measurement (enzymatic activity/volume)            22 U
/L                    









 Lipase - 17 05:30         









 Lipase            12 U/L            8-78        









 Methicillin resistant Staphylococcus aureus (MRSA) screening culture -  08:45         









 Methicillin resistant Staphylococcus aureus (MRSA) screening culture          
  NEG             NRG        









 Blood lactic acid measurement (moles/volume) - 18 05:45         









 Blood lactic acid measurement (moles/volume)            1.53 mmol/L            
0.50-2.00        









 Complete blood count (CBC) with automated white blood cell (WBC) differential 
- 18 05:45         









 Blood leukocytes automated count (number/volume)            22.6 10*3/uL      
      4.3-11.0        

 

 Blood erythrocytes automated count (number/volume)            4.39 10*6/uL    
        4.35-5.85        

 

 Venous blood hemoglobin measurement (mass/volume)            12.5 g/dL        
    13.3-17.7        

 

 Blood hematocrit (volume fraction)            36 %            40-54        

 

 Automated erythrocyte mean corpuscular volume            82 [foz_us]          
  80-99        

 

 Automated erythrocyte mean corpuscular hemoglobin (mass per erythrocyte)      
      29 pg            25-34        

 

 Automated erythrocyte mean corpuscular hemoglobin concentration measurement (
mass/volume)            35 g/dL            32-36        

 

 Automated erythrocyte distribution width ratio            12.9 %            
10.0-14.5        

 

 Automated blood platelet count (count/volume)            324 10*3/uL          
  130-400        

 

 Automated blood platelet mean volume measurement            10.1 [foz_us]     
       7.4-10.4        

 

 Automated blood neutrophils/100 leukocytes            86 %            42-75   
     

 

 Automated blood lymphocytes/100 leukocytes            6 %            12-44    
    

 

 Blood monocytes/100 leukocytes            8 %            0-12        

 

 Automated blood eosinophils/100 leukocytes            0 %            0-10     
   

 

 Automated blood basophils/100 leukocytes            0 %            0-10        

 

 Blood neutrophils automated count (number/volume)            19.4 10*3        
    1.8-7.8        

 

 Blood lymphocytes automated count (number/volume)            1.4 10*3         
   1.0-4.0        

 

 Blood monocytes automated count (number/volume)            1.7 10*3            
0.0-1.0        

 

 Automated eosinophil count            0.1 10*3/uL            0.0-0.3        

 

 Automated blood basophil count (count/volume)            0.1 10*3/uL          
  0.0-0.1        









 Comprehensive metabolic panel - 18 05:45         









 Serum or plasma sodium measurement (moles/volume)            134 mmol/L       
     135-145        

 

 Serum or plasma potassium measurement (moles/volume)            3.1 mmol/L    
        3.6-5.0        

 

 Serum or plasma chloride measurement (moles/volume)            93 mmol/L      
              

 

 Carbon dioxide            28 mmol/L            21-32        

 

 Serum or plasma anion gap determination (moles/volume)            13 mmol/L   
         5-14        

 

 Serum or plasma urea nitrogen measurement (mass/volume)            6 mg/dL    
        7-18        

 

 Serum or plasma creatinine measurement (mass/volume)            0.94 mg/dL    
        0.60-1.30        

 

 Serum or plasma urea nitrogen/creatinine mass ratio            6             
NRG        

 

 Serum or plasma creatinine measurement with calculation of estimated 
glomerular filtration rate            >             NRG        

 

 Serum or plasma glucose measurement (mass/volume)            126 mg/dL        
            

 

 Serum or plasma calcium measurement (mass/volume)            9.0 mg/dL        
    8.5-10.1        

 

 Serum or plasma total bilirubin measurement (mass/volume)            0.9 mg/dL
            0.1-1.0        

 

 Serum or plasma alkaline phosphatase measurement (enzymatic activity/volume)  
          91 U/L                    

 

 Serum or plasma aspartate aminotransferase measurement (enzymatic activity/
volume)            16 U/L            5-34        

 

 Serum or plasma alanine aminotransferase measurement (enzymatic activity/volume
)            22 U/L            0-55        

 

 Serum or plasma protein measurement (mass/volume)            7.4 g/dL         
   6.4-8.2        

 

 Serum or plasma albumin measurement (mass/volume)            3.3 g/dL         
   3.2-4.5        









 Blood manual differential performed detection - 18 05:45         









 Blood monocytes/100 leukocytes            3 %            NRG        

 

 Manual blood segmented neutrophils/100 leukocytes            82 %            
NRG        

 

 Blood band neutrophils/100 leukocytes            6 %            NRG        

 

 Manual blood lymphocytes/100 leukocytes            9 %            NRG        

 

 Blood erythrocyte morphology finding identification            NORMAL         
    NRG        



                                          



Encounters

      





 ACCT No.            Visit Date/Time            Discharge            Status    
        Pt. Type            Provider            Facility            Loc./Unit  
          Complaint        

 

 [id number]            2018 07:41:00            2018 09:42:00    
        DIS            Emergency            ALEXANDREA SMITH MD            
[facility]            ER            FLU, SHARP CP, SOB   
     

 

 [id number]            2017 21:24:00            2017 18:41:00    
        DIS            Outpatient            ETTA ANTOINE MD            
[facility]            INTRACTABLE RUQ PAIN
, LEUKOCYTOSIS, CHOLELITHIASIS        

 

 [id number]            2013 09:55:00            2013 11:40:00    
        DIS            Emergency            ALEXANDREA SMITH MD            
[facility]            ER            CONGESTION COUGHING  
      

 

 [id number]            2013 16:32:00            2013 18:13:00    
        DIS            Emergency            SINA DICK DO            
[facility]            ER            R CHEEK PAIN        

 

 [id number]            2013 19:57:00            2013 22:43:00    
        DIS            Emergency            RK BENITEZ MD            
[facility]            ER            R SHOULDER POPING OUT 
OF PLACE        

 

 [id number]            2018 06:10:00                                   
   Document Registration

## 2018-01-30 NOTE — ED COUGH/URI
General


Chief Complaint:  Cough/Cold/Flu Symptoms


Stated Complaint:  SOB


Nursing Triage Note:  


PT REPORTS HE WAS SEEN IN THIS ED ON 1/27/18 FOR FLU LIKE SYMPTOMS. HE REPORTS 


NOW HE IS HAVING PRODUCTIVE COUGH AND HE IS CONCERNED HE HAS PNEUMONIA.


Source:  patient





History of Present Illness


Date Seen by Provider:  Jan 30, 2018


Time Seen by Provider:  04:40


Initial Comments


PT WAS SEEN HERE 01/27/18 FOR FLU-LIKE SYMPTOMS AND GIVEN RX FOR TAMIFLU AND 

ALBUTEROL INHALER


STATES HE DID NOT GET RX FOR ALBUTEROL INHALER FILLED. 


PT STATES "I BROKE THE FLU" "BUT NOW THERE'S FLUID IN MY LUNGS AND I CAN'T 

CATCH MY BREATH" --THINKS HE HAS PNEUMONIA--HAS HAD BEFORE, A FEW YEARS AGO, 

AND THIS FEELS SIMILAR


STATES HE HAS NOT HAD FEVER 


COUGH IS PRODUCTIVE WITH COLORED SPUTUM AND IS GETTING WORSE


STATES HE WAS HAVING CHEST PAIN FROM THE FLU, AND STILL HAS SOME CHEST PAIN 

WITH BREATHING.


STATES HE HAS NOT BEEN ABLE TO SLEEP FOR THE LAST 2 NIGHTS





PCP:[facility]





Allergies and Home Medications


Allergies


Coded Allergies:  


     No Known Drug Allergies (Unverified , 5/30/13)





Home Medications


Albuterol Sulfate 1 Puff Puff, 1-4 PUFF IH Q4H PRN for SHORTNESS OF BREATH, #1


   1 PUFF = 90 MCG 


   Prescribed by: ALEXANDREA MELVIN on 1/27/18 0921


Hydrocodone Bit/Acetaminophen 1 Each Tablet, 1-2 TAB PO 4-6HR PRN for PAIN, #30


   Prescribed by: ETTA ANTOINE on 3/6/17 1417


Oseltamivir Phosphate 75 Mg Cap, 75 MG PO BID, #10


   Prescribed by: ALEXANDREA MELVIN on 1/27/18 0921





Constitutional:  No chills, No diaphoresis, No fever, malaise, weakness


EENTM:  see HPI, nose congestion


Respiratory:  see HPI, cough, phlegm, short of breath, wheezing


Cardiovascular:  see HPI, chest pain


Gastrointestinal:  loss of appetite


Genitourinary:  no symptoms reported


Musculoskeletal:  no symptoms reported


Skin:  no symptoms reported


Psychiatric/Neurological:  No Symptoms Reported


Hematologic/Lymphatic:  No Symptoms Reported


Immunological/Allergic:  no symptoms reported





Past Medical-Social-Family Hx


Patient Social History


Alcohol Use:  Denies Use


Recreational Drug Use:  No


Smoking Status:  Former Smoker (1/2 PPD, NOW "VAPES" )


Type Used:  Cigarettes, Electronic/Vapor


Former Smoker, Quit:  Jan 9, 2017


2nd Hand Smoke Exposure:  No


Recent Foreign Travel:  No


Contact w/Someone Who Travel:  No


Recent Infectious Disease Expo:  No


Recent Hopitalizations:  No





Immunizations Up To Date


Tetanus Booster (TDap):  Unknown


PED Vaccines UTD:  No





Seasonal Allergies


Seasonal Allergies:  No





Surgeries


History of Surgeries:  Yes


Surgeries:  Gallbladder





Respiratory


History of Respiratory Disorde:  Yes


Respiratory Disorders:  Pneumonia





Cardiovascular


History of Cardiac Disorders:  No





Neurological


History of Neurological Disord:  No





Reproductive System


Hx Reproductive Disorders:  No





Genitourinary


History of Genitourinary Disor:  No





Gastrointestinal


History of Gastrointestinal Di:  No





Musculoskeletal


History of Musculoskeletal Dis:  No





Endocrine


History of Endocrine Disorders:  No





HEENT


History of HEENT Disorders:  No





Cancer


History of Cancer:  No





Psychosocial


History of Psychiatric Problem:  No





Integumentary


History of Skin or Integumenta:  No





Blood Transfusions


History of Blood Disorders:  No


Adverse Reaction to a Blood Tr:  No





Family Medical History


Significant Family History:  Heart Disease, COPD


Family Medial History:  


FH: congestive heart failure


  19 FATHER





Physical Exam


Vital Signs





Vital Sign - Last 12Hours








 1/30/18





 04:44


 


Temp 98.6


 


Pulse 85


 


Resp 16


 


B/P (MAP) 122/70 (87)


 


Pulse Ox 97


 


O2 Delivery Room Air





Capillary Refill : Less Than 3 Seconds


General Appearance:  WD/WN, no apparent distress


HEENT:  PERRL/EOMI, other (NASAL MUCOSAL EDEMA)


Neck:  non-tender, full range of motion, supple, normal inspection


Respiratory:  no respiratory distress, no accessory muscle use, rales, rhonchi, 

wheezing, expiration, other (DIFFUSE/BILATERAL RALES/RHONCHI/EXPIRATORY WHEEZING

)


Cardiovascular:  regular rate, rhythm, no murmur


Gastrointestinal:  soft


Extremities:  normal inspection, no pedal edema


Neurologic/Psychiatric:  CNs II-XII nml as tested, no motor/sensory deficits, 

alert, normal mood/affect, oriented x 3


Skin:  normal color, warm/dry





Focused Exam


Evaluation


Lactate Level


Laboratory Tests


1/30/18 05:45: Lactic Acid Level 1.53





Lactic Acid Level





Laboratory Tests








Test


  1/30/18


05:45


 


Lactic Acid Level


  1.53 MMOL/L


(0.50-2.00)











Progress/Results/Core Measures


Suspected Sepsis


Recent Fever Within 48 Hours:  No


Infection Criteria Present:  None


New/Unexplained  Altered Menta:  No


Sepsis Screen:  No Definite Risk


Sepsis Diagnosis:  


SIRS


Temperature:98.6 


Pulse: 85 


Respiratory Rate: 16


 


Laboratory Tests


1/30/18 05:45: White Blood Count 22.6H


Blood Pressure 122 /70 


Mean: 87


 





Laboratory Tests


1/30/18 05:45: Lactic Acid Level 1.53








Laboratory Tests


1/30/18 05:45: 


Creatinine 0.94, Platelet Count 324, Total Bilirubin 0.9





Results/Orders


Lab Results





Laboratory Tests








Test


  1/30/18


05:45 Range/Units


 


 


White Blood Count


  22.6 H


  4.3-11.0


10^3/uL


 


Red Blood Count


  4.39 


  4.35-5.85


10^6/uL


 


Hemoglobin 12.5 L 13.3-17.7  G/DL


 


Hematocrit 36 L 40-54  %


 


Mean Corpuscular Volume 82  80-99  FL


 


Mean Corpuscular Hemoglobin 29  25-34  PG


 


Mean Corpuscular Hemoglobin


Concent 35 


  32-36  G/DL


 


 


Red Cell Distribution Width 12.9  10.0-14.5  %


 


Platelet Count


  324 


  130-400


10^3/uL


 


Mean Platelet Volume 10.1  7.4-10.4  FL


 


Neutrophils (%) (Auto) 86 H 42-75  %


 


Lymphocytes (%) (Auto) 6 L 12-44  %


 


Monocytes (%) (Auto) 8  0-12  %


 


Eosinophils (%) (Auto) 0  0-10  %


 


Basophils (%) (Auto) 0  0-10  %


 


Neutrophils # (Auto) 19.4 H 1.8-7.8  X 10^3


 


Lymphocytes # (Auto) 1.4  1.0-4.0  X 10^3


 


Monocytes # (Auto) 1.7 H 0.0-1.0  X 10^3


 


Eosinophils # (Auto)


  0.1 


  0.0-0.3


10^3/uL


 


Basophils # (Auto)


  0.1 


  0.0-0.1


10^3/uL


 


Neutrophils % (Manual) 82   %


 


Lymphocytes % (Manual) 9   %


 


Monocytes % (Manual) 3   %


 


Band Neutrophils 6   %


 


Blood Morphology Comment NORMAL   


 


Sodium Level 134 L 135-145  MMOL/L


 


Potassium Level 3.1 L 3.6-5.0  MMOL/L


 


Chloride Level 93 L   MMOL/L


 


Carbon Dioxide Level 28  21-32  MMOL/L


 


Anion Gap 13  5-14  MMOL/L


 


Blood Urea Nitrogen 6 L 7-18  MG/DL


 


Creatinine


  0.94 


  0.60-1.30


MG/DL


 


Estimat Glomerular Filtration


Rate > 60 


   


 


 


BUN/Creatinine Ratio 6   


 


Glucose Level 126 H   MG/DL


 


Lactic Acid Level


  1.53 


  0.50-2.00


MMOL/L


 


Calcium Level 9.0  8.5-10.1  MG/DL


 


Total Bilirubin 0.9  0.1-1.0  MG/DL


 


Aspartate Amino Transf


(AST/SGOT) 16 


  5-34  U/L


 


 


Alanine Aminotransferase


(ALT/SGPT) 22 


  0-55  U/L


 


 


Alkaline Phosphatase 91    U/L


 


Total Protein 7.4  6.4-8.2  GM/DL


 


Albumin 3.3  3.2-4.5  GM/DL








My Orders





Orders - RUTH GARCIA DO


Chest Pa/Lat (2 View) (1/30/18 04:40)


Albuterol/Ipra Inhalation Soln (Duoneb I (1/30/18 05:00)


Rt Request For Service (1/30/18 04:50)


Svn Sm Volume Nebulizer Rt-Rfs (1/30/18 04:50)


Saline Lock/Iv-Start (1/30/18 05:46)


Cbc With Automated Diff (1/30/18 05:46)


Comprehensive Metabolic Panel (1/30/18 05:46)


Lactic Acid Analyzer (1/30/18 05:46)


Blood Culture (1/30/18 05:46)


Ceftriaxone Injection (Rocephin Injectio (1/30/18 06:00)


Methylprednisolone Sod Succ (Solu-Medrol (1/30/18 05:46)


Manual Differential (1/30/18 05:45)





Medications Given in ED





Current Medications








 Medications  Dose


 Ordered  Sig/Shayna


 Route  Start Time


 Stop Time Status Last Admin


Dose Admin


 


 Albuterol/


 Ipratropium  3 ml  ONCE  ONCE


 INH  1/30/18 05:00


 1/30/18 05:01 DC 1/30/18 04:59


3 ML


 


 Ceftriaxone


 Sodium 1000 mg/


 Sodium Chloride  50 ml @ 


 100 mls/hr  ONCE  ONCE


 IV  1/30/18 06:00


 1/30/18 06:29 DC 1/30/18 06:27


100 MLS/HR








Vital Signs/I&O





Vital Sign - Last 12Hours








 1/30/18 1/30/18





 04:44 05:00


 


Temp 98.6 


 


Pulse 85 


 


Resp 16 


 


B/P (MAP) 122/70 (87) 


 


Pulse Ox 97 94


 


O2 Delivery Room Air Room Air





Capillary Refill : Less Than 3 Seconds








Blood Pressure Mean:  87








Progress Note :  


Progress Note


GIVEN DUO NEB TREATMENT, INCREASED AERATION , DECREASED WHEEZING /RALES/

RHONCHI. PT STATES HE FEELS MUCH BETTER AFTER TREATMENT. AND IS ABLE TO LAY 

DOWN AND REST WITHOUT HAVING DIFFICULTY BREATHING





Diagnostic Imaging





Comments


CXR--RIGHT PERIHILAR INFILTRATE, PENDING RADIOLOGIST REVIEW


   Reviewed:  Reviewed by Me





Departure


Communication (Admissions)


Time/Spoke to Admitting Phy:  06:25


Communication


SPOKE WITH DR. MCKEON, ACCEPTS PT FOR ADMIT.





Impression


Impression:  


 Primary Impression:  


 RIGHT PERIHILAR PNEUMONIA


Disposition:  09 ADMITTED AS INPATIENT


Condition:  Improved





Admissions


Decision to Admit Reason:  Admit from ER (General)


Decision to Admit/Date:  Jan 30, 2018


Time/Decision to Admit Time:  06:25





Departure-Patient Inst.


Referrals:  


[facility] BLACK DO Jan 30, 2018 05:00

## 2018-01-30 NOTE — SHORT STAY SUMMARY
History of Present Illness


History of Present Illness


Reason for visit/HPI


33 yo male came to ER due to chest tightness and shortness of breath. He was 

treated presumptively for influenza earlier this week and had started to get 

better when he had a sudden turn for the worse, fever up to 101 and severe 

cough.


Date of Admission


2018 at 6:25 am


Date of Discharge


2018 at 1:01 pm


Time Seen by Provider:  10:00


Attending Physician


Carlota Gaines MD


Admitting Physician


[facility] [place]


Consult








Allergies and Home Medications


Allergies


Coded Allergies:  


     No Known Drug Allergies (Unverified , 13)





Home Medications


Benzonatate 100 Mg Capsule, 100 MG PO TID PRN for COUGH, #30 Ref 0


   Prescribed by: CARLOTA GAINES on 18 1126


Doxycycline Hyclate 100 Mg Tablet, 100 MG PO BID for 7 Days, #14 Ref 0


   Prescribed by: CARLOTA GAINES on 18 1126





Past Medical-Social-Family Hx


Patient Social History


Alcohol Use:  Denies Use


Recreational Drug Use:  No


Smoking Status:  Former Smoker (/2 PPD, NOW "VAPES" )


Former Smoker, Quit:  2017


Type Used:  Cigarettes, Electronic/Vapor


2nd Hand Smoke Exposure:  No


Physical Abuse Screen:  No


Sexual Abuse:  No


Recent Foreign Travel:  No


Contact w/other who traveled:  No


Recent Hopitalizations:  No


Recent Infectious Disease Expo:  No





Immunizations Up To Date


Tetanus Booster (TDap):  Unknown


Pediatric:  No





Seasonal Allergies


Seasonal Allergies:  No





Surgeries


Yes


Gallbladder





Respiratory


No





Cardiovascular


No





Neurological


No





Reproductive System


Hx Reproductive Disorders:  No





Genitourinary


No





Gastrointestinal


No





Musculoskeletal


No





Endocrine


History of Endocrine Disorders:  No





HEENT


History of HEENT Disorders:  No





Cancer


No





Psychosocial


History of Psychiatric Problem:  No





Integumentary


History of Skin or Integumenta:  No





Blood Transfusions


History of Blood Disorders:  No


Adverse Reaction to a Blood Tr:  No





Family Medical History


Significant Family History:  Heart Disease, COPD


Family Hx:  


FH: congestive heart failure


  19 FATHER





Constitutional:  chills, fever


EENTM:  No nose congestion, No throat pain


Respiratory:  cough, short of breath


Cardiovascular:  No chest pain


Gastrointestinal:  No abdominal pain, No constipation, diarrhea, vomiting


Genitourinary:  no symptoms reported


Musculoskeletal:  muscle pain


Skin:  no symptoms reported


Psychiatric/Neurological:  No Symptoms Reported





Physical Exam


Vital Signs





Vital Sign - Last 12Hours








 18





 04:44


 


Temp 98.6


 


Pulse 85


 


Resp 16


 


B/P (MAP) 122/70 (87)


 


Pulse Ox 97


 


O2 Delivery Room Air





Capillary Refill : Less Than 3 Seconds


General Appearance:  No Apparent Distress, WD/WN


Respiratory:  Lungs Clear, Normal Breath Sounds


Cardiovascular:  Regular Rate, Rhythm, No Murmur


Gastrointestinal:  Normal Bowel Sounds, Non Tender, Soft


Extremity:  No Pedal Edema


Neurologic/Psychiatric:  Alert, Oriented x3


Skin:  Normal Color, Warm/Dry





Clinical Quality Measures


DVT/VTE Risk/Contraindication:


Risk Factor Score Per Nursin


RFS Level Per Nursing on Admit:  1=Low/No VTE PPX





Short Stay Diagnosis


Discharge Diagnosis-Short Stay


Admission Diagnosis:  


Influenza


Pneumonia


Final Discharge Diagnosis:  


Influenza- was given script for Tamiflu but could not afford, okay to not


treat given he is low risk for complications and had already improved from


initial flu symptoms





Pneumonia- CURB65 score of 0, no sepsis criteria, no hypoxia, okay to


treat outpatient given that he was able to tolerate oral breakfast and


dinner with no vomiting. Discharged with course of doxycycline.





Conclusion


Labs


Laboratory Tests


18 05:45: 


White Blood Count 22.6H, Red Blood Count 4.39, Hemoglobin 12.5L, Hematocrit 36L

, Mean Corpuscular Volume 82, Mean Corpuscular Hemoglobin 29, Mean Corpuscular 

Hemoglobin Concent 35, Red Cell Distribution Width 12.9, Platelet Count 324, 

Mean Platelet Volume 10.1, Neutrophils (%) (Auto) 86H, Lymphocytes (%) (Auto) 6L

, Monocytes (%) (Auto) 8, Eosinophils (%) (Auto) 0, Basophils (%) (Auto) 0, 

Neutrophils # (Auto) 19.4H, Lymphocytes # (Auto) 1.4, Monocytes # (Auto) 1.7H, 

Eosinophils # (Auto) 0.1, Basophils # (Auto) 0.1, Neutrophils % (Manual) 82, 

Lymphocytes % (Manual) 9, Monocytes % (Manual) 3, Band Neutrophils 6, Blood 

Morphology Comment NORMAL, Sodium Level 134L, Potassium Level 3.1L, Chloride 

Level 93L, Carbon Dioxide Level 28, Anion Gap 13, Blood Urea Nitrogen 6L, 

Creatinine 0.94, Estimat Glomerular Filtration Rate > 60, BUN/Creatinine Ratio 6

, Glucose Level 126H, Lactic Acid Level 1.53, Calcium Level 9.0, Total 

Bilirubin 0.9, Aspartate Amino Transf (AST/SGOT) 16, Alanine Aminotransferase (

ALT/SGPT) 22, Alkaline Phosphatase 91, Total Protein 7.4, Albumin 3.3


Conclusion/Plan


See final discharge diagnosis











CARLOTA GAINES MD 2018 4:41 pm

## 2018-01-30 NOTE — DISCHARGE INSTRUCTIONS
Discharge [facility]


Discharge Medications


New, Converted or Re-Newed RX:  Transmitted to Pharmacy


New Medications:  


Doxycycline Hyclate (Doxycycline Hyclate) 100 Mg Tablet


100 MG PO BID for 7 Days, #14 TAB 0 Refills





Benzonatate (Benzonatate) 100 Mg Capsule


100 MG PO TID PRN for COUGH, #30 CAP 0 Refills











Patient Instructions


Goal/Follow Up Appt:  


Follow up with Dr. Gaines on 2/6 at 140 pm.


Return to [facility] For:  


Fever, worsening shortness of breath, inability to keep down antibiotics.





Activity & Diet


Discharge Diet:  Regular Diet


Activity as Tolerated:  Yes





Copy


Copies To 1:   CARLOTA GAINES MD, BETHANY N MD Jan 30, 2018 11:32

## 2018-01-30 NOTE — XMS REPORT
Continuity of Care Document

 Created on: 2018



MITCHEL FUENTES

External Reference #: [id number]

: 1985

Sex: Male



Demographics







 Address  [address] [place]  [address]

 

 Home Phone  [phone] x

 

 Preferred Language  Unknown

 

 Marital Status  Unknown

 

 Yazdanism Affiliation  Unknown

 

 Race  Unknown

 

 Ethnic Group  Unknown





Author







 Author  [facility]

 

 Organization  [facility]

 

 Address  Unknown

 

 Phone  Unavailable



              



Allergies

      





 Active            Description            Code            Type            
Severity            Reaction            Onset            Reported/Identified   
         Relationship to Patient            Clinical Status        

 

 Yes            No Known Drug Allergies            [id number]            Drug 
Allergy            Unknown            N/A                         2013   
                               



                  



Medications

      



There is no data.                  



Problems

      





 Date Dx Coded            Attending            Type            Code            
Diagnosis            Diagnosed By        

 

 2013            RK BENITEZ MD            Ot            719.41  
          JOINT PAIN-SHLDER                     

 

 2013            RK BENITEZ MD            Ot            959.2   
         SHLDR/UPPER ARM INJ NOS                     

 

 2013            RK BENITEZ MD            Ot            E000.8  
          OTHER EXTERNAL CAUSE STATUS                     

 

 2013            RK BENITEZ MD            Ot            E849.4  
          ACCID IN RECREATION AREA                     

 

 2013            RK BENITEZ MD            Ot            E928.9  
          ACCIDENT NOS                     

 

 2013            SINA DICK DO            Ot            522.5    
        PERIAPICAL ABSCESS                     

 

 2013            SINA DICK DO            Ot            525.9    
        DENTAL DISORDER NOS                     

 

 2013            SARAH LEDEZMA, ALEXANDREA SCHULTZ            Ot            490   
         BRONCHITIS NOS                     

 

 2013            SARAH LEDEZMA, ALEXANDREA SCHULTZ            Ot            786.2 
           COUGH                     

 

 2017            ETTA ANTOINE MD            Ot            F17.210   
         NICOTINE DEPENDENCE, CIGARETTES, UNCOMPL                     

 

 2017            ETTA ANTOINE MD            Ot            K80.12    
        CALCULUS OF GB W ACUTE AND CHRONIC JAMAAL                     

 

 2017            ETTA ANTOINE MD            Ot            F17.210   
         NICOTINE DEPENDENCE, CIGARETTES, UNCOMPL                     

 

 2017            ETTA ANTOINE MD            Ot            K80.12    
        CALCULUS OF GB W ACUTE AND CHRONIC JAMAAL                     

 

 2017            ETTA ANTOINE MD            Ot            F17.210   
         NICOTINE DEPENDENCE, CIGARETTES, UNCOMPL                     

 

 2017            ETTA ANTOINE MD            Ot            K80.12    
        CALCULUS OF GB W ACUTE AND CHRONIC JAMAAL                     



                                              



Procedures

      



There is no data.                  



Results

      





 Test            Result            Range        









 Complete urinalysis with reflex to culture - 17 00:00         









 Urine color determination            YELLOW             NRG        

 

 Urine clarity determination            CLEAR             NRG        

 

 Urine pH measurement by test strip            5             5-9        

 

 Specific gravity of urine by test strip            1.015             1.016-
1.022        

 

 Urine protein assay by test strip, semi-quantitative            1+             
NEGATIVE        

 

 Urine glucose detection by automated test strip            NEGATIVE           
  NEGATIVE        

 

 Erythrocytes detection in urine sediment by light microscopy            
NEGATIVE             NEGATIVE        

 

 Urine ketones detection by automated test strip            NEGATIVE           
  NEGATIVE        

 

 Urine nitrite detection by test strip            NEGATIVE             NEGATIVE
        

 

 Urine total bilirubin detection by test strip            NEGATIVE             
NEGATIVE        

 

 Urine urobilinogen measurement by automated test strip (mass/volume)          
  NORMAL             NORMAL        

 

 Urine leukocyte esterase detection by dipstick            NEGATIVE             
NEGATIVE        

 

 Automated urine sediment erythrocyte count by microscopy (number/high power 
field)            NONE             NRG        

 

 Automated urine sediment leukocyte count by microscopy (number/high power field
)            NONE             NRG        

 

 Bacteria detection in urine sediment by light microscopy            NEGATIVE  
           NRG        

 

 Squamous epithelial cells detection in urine sediment by light microscopy     
       5-10             NRG        

 

 Crystals detection in urine sediment by light microscopy            NONE      
       NRG        

 

 Casts detection in urine sediment by light microscopy            NONE         
    NRG        

 

 Mucus detection in urine sediment by light microscopy            NEGATIVE     
        NRG        

 

 Complete urinalysis with reflex to culture            NO             NRG      
  









 Complete blood count (CBC) with automated white blood cell (WBC) differential 
- 17 23:30         









 Blood leukocytes automated count (number/volume)            23.0 10*3/uL      
      4.3-11.0        

 

 Blood erythrocytes automated count (number/volume)            5.29 10*6/uL    
        4.35-5.85        

 

 Venous blood hemoglobin measurement (mass/volume)            15.1 g/dL        
    13.3-17.7        

 

 Blood hematocrit (volume fraction)            44 %            40-54        

 

 Automated erythrocyte mean corpuscular volume            82 [foz_us]          
  80-99        

 

 Automated erythrocyte mean corpuscular hemoglobin (mass per erythrocyte)      
      29 pg            25-34        

 

 Automated erythrocyte mean corpuscular hemoglobin concentration measurement (
mass/volume)            35 g/dL            32-36        

 

 Automated erythrocyte distribution width ratio            12.2 %            
10.0-14.5        

 

 Automated blood platelet count (count/volume)            336 10*3/uL          
  130-400        

 

 Automated blood platelet mean volume measurement            10.7 [foz_us]     
       7.4-10.4        

 

 Automated blood neutrophils/100 leukocytes            87 %            42-75   
     

 

 Automated blood lymphocytes/100 leukocytes            9 %            12-44    
    

 

 Blood monocytes/100 leukocytes            4 %            0-12        

 

 Automated blood eosinophils/100 leukocytes            0 %            0-10     
   

 

 Automated blood basophils/100 leukocytes            0 %            0-10        

 

 Blood neutrophils automated count (number/volume)            20.1 10*3        
    1.8-7.8        

 

 Blood lymphocytes automated count (number/volume)            2.1 10*3         
   1.0-4.0        

 

 Blood monocytes automated count (number/volume)            0.8 10*3            
0.0-1.0        

 

 Automated eosinophil count            0.0 10*3/uL            0.0-0.3        

 

 Automated blood basophil count (count/volume)            0.0 10*3/uL          
  0.0-0.1        









 Blood manual differential performed detection - 17 23:30         









 Blood monocytes/100 leukocytes            2 %            NRG        

 

 Manual blood segmented neutrophils/100 leukocytes            77 %            
NRG        

 

 Blood band neutrophils/100 leukocytes            4 %            NRG        

 

 Manual blood lymphocytes/100 leukocytes            11 %            NRG        

 

 Manual eosinophils/100 leukocytes in nose            0 %            NRG        

 

 Manual blood basophils/100 leukocytes            0 %            NRG        

 

 Blood lymphocytes variant/100 leukocytes            6 %            NRG        

 

 Blood erythrocyte morphology finding identification            NORMAL         
    [facility]        









 Comprehensive metabolic panel - 17 23:30         









 Serum or plasma sodium measurement (moles/volume)            138 mmol/L       
     135-145        

 

 Serum or plasma potassium measurement (moles/volume)            3.6 mmol/L    
        3.6-5.0        

 

 Serum or plasma chloride measurement (moles/volume)            103 mmol/L     
               

 

 Carbon dioxide            24 mmol/L            21-32        

 

 Serum or plasma anion gap determination (moles/volume)            11 mmol/L   
         5-14        

 

 Serum or plasma urea nitrogen measurement (mass/volume)            9 mg/dL    
        7-18        

 

 Serum or plasma creatinine measurement (mass/volume)            1.01 mg/dL    
        0.60-1.30        

 

 Serum or plasma urea nitrogen/creatinine mass ratio            9             
NRG        

 

 Serum or plasma creatinine measurement with calculation of estimated 
glomerular filtration rate            >             NRG        

 

 Serum or plasma glucose measurement (mass/volume)            122 mg/dL        
            

 

 Serum or plasma calcium measurement (mass/volume)            9.0 mg/dL        
    8.5-10.1        

 

 Serum or plasma total bilirubin measurement (mass/volume)            0.4 mg/dL
            0.1-1.0        

 

 Serum or plasma alkaline phosphatase measurement (enzymatic activity/volume)  
          82 U/L                    

 

 Serum or plasma aspartate aminotransferase measurement (enzymatic activity/
volume)            24 U/L            5-34        

 

 Serum or plasma alanine aminotransferase measurement (enzymatic activity/volume
)            30 U/L            0-55        

 

 Serum or plasma protein measurement (mass/volume)            7.3 g/dL         
   6.4-8.2        

 

 Serum or plasma albumin measurement (mass/volume)            4.2 g/dL         
   3.2-4.5        









 Lipase - 17 23:30         









 Lipase            24 U/L            8-78        









 Complete blood count (CBC) with automated white blood cell (WBC) differential 
- 17 05:30         









 Blood leukocytes automated count (number/volume)            15.6 10*3/uL      
      4.3-11.0        

 

 Blood erythrocytes automated count (number/volume)            4.99 10*6/uL    
        4.35-5.85        

 

 Venous blood hemoglobin measurement (mass/volume)            14.5 g/dL        
    13.3-17.7        

 

 Blood hematocrit (volume fraction)            41 %            40-54        

 

 Automated erythrocyte mean corpuscular volume            83 [foz_us]          
  80-99        

 

 Automated erythrocyte mean corpuscular hemoglobin (mass per erythrocyte)      
      29 pg            25-34        

 

 Automated erythrocyte mean corpuscular hemoglobin concentration measurement (
mass/volume)            35 g/dL            32-36        

 

 Automated erythrocyte distribution width ratio            12.3 %            
10.0-14.5        

 

 Automated blood platelet count (count/volume)            275 10*3/uL          
  130-400        

 

 Automated blood platelet mean volume measurement            10.8 [foz_us]     
       7.4-10.4        

 

 Automated blood neutrophils/100 leukocytes            80 %            42-75   
     

 

 Automated blood lymphocytes/100 leukocytes            15 %            12-44   
     

 

 Blood monocytes/100 leukocytes            5 %            0-12        

 

 Automated blood eosinophils/100 leukocytes            0 %            0-10     
   

 

 Automated blood basophils/100 leukocytes            0 %            0-10        

 

 Blood neutrophils automated count (number/volume)            12.4 10*3        
    1.8-7.8        

 

 Blood lymphocytes automated count (number/volume)            2.4 10*3         
   1.0-4.0        

 

 Blood monocytes automated count (number/volume)            0.8 10*3            
0.0-1.0        

 

 Automated eosinophil count            0.0 10*3/uL            0.0-0.3        

 

 Automated blood basophil count (count/volume)            0.0 10*3/uL          
  0.0-0.1        









 Comprehensive metabolic panel - 17 05:30         









 Serum or plasma sodium measurement (moles/volume)            139 mmol/L       
     135-145        

 

 Serum or plasma potassium measurement (moles/volume)            4.0 mmol/L    
        3.6-5.0        

 

 Serum or plasma chloride measurement (moles/volume)            106 mmol/L     
               

 

 Carbon dioxide            23 mmol/L            21-32        

 

 Serum or plasma anion gap determination (moles/volume)            10 mmol/L   
         5-14        

 

 Serum or plasma urea nitrogen measurement (mass/volume)            7 mg/dL    
        7-18        

 

 Serum or plasma creatinine measurement (mass/volume)            0.83 mg/dL    
        0.60-1.30        

 

 Serum or plasma urea nitrogen/creatinine mass ratio            8             
NRG        

 

 Serum or plasma creatinine measurement with calculation of estimated 
glomerular filtration rate            >             NRG        

 

 Serum or plasma glucose measurement (mass/volume)            112 mg/dL        
            

 

 Serum or plasma calcium measurement (mass/volume)            8.3 mg/dL        
    8.5-10.1        

 

 Serum or plasma total bilirubin measurement (mass/volume)            0.5 mg/dL
            0.1-1.0        

 

 Serum or plasma alkaline phosphatase measurement (enzymatic activity/volume)  
          72 U/L                    

 

 Serum or plasma aspartate aminotransferase measurement (enzymatic activity/
volume)            21 U/L            5-34        

 

 Serum or plasma alanine aminotransferase measurement (enzymatic activity/volume
)            26 U/L            0-55        

 

 Serum or plasma protein measurement (mass/volume)            6.4 g/dL         
   6.4-8.2        

 

 Serum or plasma albumin measurement (mass/volume)            3.7 g/dL         
   3.2-4.5        









 Serum or plasma amylase measurement (enzymatic activity/volume) - 17 05:
30         









 Serum or plasma amylase measurement (enzymatic activity/volume)            22 U
/L                    









 Lipase - 17 05:30         









 Lipase            12 U/L            8-78        









 Methicillin resistant Staphylococcus aureus (MRSA) screening culture -  08:45         









 Methicillin resistant Staphylococcus aureus (MRSA) screening culture          
  NEG             NRG        



                                  



Encounters

      





 ACCT No.            Visit Date/Time            Discharge            Status    
        Pt. Type            Provider            Facility            Loc./Unit  
          Complaint        

 

 [id number]            2018 07:41:00            2018 09:42:00    
        DIS            Emergency            SARAH LEDEZMA, ALEXANDREA SCHULTZ            
[facility]            ER            FLU, SHARP CP, SOB   
     

 

 [id number]            2017 21:24:00            2017 18:41:00    
        DIS            Outpatient            ELINA LEDEZMA, ETTA ZAMORA            
[facility]            SDC            INTRACTABLE RUQ PAIN
, LEUKOCYTOSIS, CHOLELITHIASIS        

 

 [id number]            2013 09:55:00            2013 11:40:00    
        DIS            Emergency            SARAH LEDEZMA, ALEXANDREA SCHULTZ            
[facility]            ER            CONGESTION COUGHING  
      

 

 [id number]            2013 16:32:00            2013 18:13:00    
        DIS            Emergency            SINA DICK DO            
[facility]            ER            R CHEEK PAIN        

 

 [id number]            2013 19:57:00            2013 22:43:00    
        DIS            Emergency            ELI LEDEZMA, RK HERNÁNDEZ            
[facility]            ER            R SHOULDER POPING OUT 
OF PLACE